# Patient Record
Sex: FEMALE | Race: WHITE | NOT HISPANIC OR LATINO | Employment: FULL TIME | URBAN - METROPOLITAN AREA
[De-identification: names, ages, dates, MRNs, and addresses within clinical notes are randomized per-mention and may not be internally consistent; named-entity substitution may affect disease eponyms.]

---

## 2023-11-03 ENCOUNTER — HOSPITAL ENCOUNTER (OUTPATIENT)
Dept: CARDIOLOGY | Facility: HOSPITAL | Age: 78
Discharge: HOME | End: 2023-11-03
Payer: COMMERCIAL

## 2023-11-03 DIAGNOSIS — I48.0 PAROXYSMAL ATRIAL FIBRILLATION (MULTI): ICD-10-CM

## 2023-11-03 DIAGNOSIS — Z95.0 PACEMAKER: Primary | ICD-10-CM

## 2023-11-03 DIAGNOSIS — Z95.0 PACEMAKER: ICD-10-CM

## 2023-11-06 PROBLEM — S16.1XXA CERVICAL MYOFASCIAL STRAIN: Status: ACTIVE | Noted: 2018-04-13

## 2023-11-06 PROBLEM — N39.0 UTI (URINARY TRACT INFECTION): Status: ACTIVE | Noted: 2023-08-22

## 2023-11-06 PROBLEM — S06.5XAA SUBDURAL HEMATOMA (MULTI): Status: ACTIVE | Noted: 2023-08-21
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
## 2023-11-06 PROBLEM — I60.9 SAH (SUBARACHNOID HEMORRHAGE) (MULTI): Status: ACTIVE | Noted: 2023-08-21

## 2023-11-06 PROBLEM — S39.012A LUMBAR STRAIN: Status: ACTIVE | Noted: 2018-04-13

## 2023-11-06 PROBLEM — E11.9 T2DM (TYPE 2 DIABETES MELLITUS) (MULTI): Status: ACTIVE | Noted: 2023-08-25

## 2023-11-06 PROBLEM — R55 SYNCOPE AND COLLAPSE: Status: ACTIVE | Noted: 2023-08-22

## 2023-11-06 RX ORDER — METOPROLOL TARTRATE 25 MG/1
25 TABLET, FILM COATED ORAL EVERY 12 HOURS
Status: ON HOLD | COMMUNITY
Start: 2023-08-26 | End: 2023-11-24

## 2023-11-06 RX ORDER — ASPIRIN 325 MG
1-2 TABLET ORAL DAILY
Status: ON HOLD | COMMUNITY
Start: 2019-08-27 | End: 2024-06-09 | Stop reason: ALTCHOICE

## 2023-11-06 RX ORDER — WARFARIN 2.5 MG/1
TABLET ORAL
Status: ON HOLD | COMMUNITY
Start: 2011-04-25

## 2023-11-06 RX ORDER — ACETAMINOPHEN 500 MG
1000 TABLET ORAL
Status: ON HOLD | COMMUNITY
Start: 2023-08-26 | End: 2024-06-09 | Stop reason: ALTCHOICE

## 2023-11-06 RX ORDER — IBUPROFEN 200 MG
CAPSULE ORAL
Status: ON HOLD | COMMUNITY
Start: 2023-08-26

## 2023-11-06 RX ORDER — WARFARIN SODIUM 5 MG/1
TABLET ORAL
Status: ON HOLD | COMMUNITY
Start: 2011-04-25

## 2023-11-06 RX ORDER — METFORMIN HYDROCHLORIDE 1000 MG/1
1 TABLET ORAL
Status: ON HOLD | COMMUNITY
Start: 2023-09-16

## 2023-11-07 ENCOUNTER — ANCILLARY PROCEDURE (OUTPATIENT)
Dept: CARDIOLOGY | Facility: CLINIC | Age: 78
End: 2023-11-07
Payer: COMMERCIAL

## 2023-11-07 ENCOUNTER — OFFICE VISIT (OUTPATIENT)
Dept: CARDIOLOGY | Facility: CLINIC | Age: 78
End: 2023-11-07
Payer: COMMERCIAL

## 2023-11-07 VITALS
SYSTOLIC BLOOD PRESSURE: 142 MMHG | OXYGEN SATURATION: 98 % | BODY MASS INDEX: 28.16 KG/M2 | WEIGHT: 169 LBS | HEIGHT: 65 IN | TEMPERATURE: 98.6 F | DIASTOLIC BLOOD PRESSURE: 80 MMHG | HEART RATE: 69 BPM

## 2023-11-07 DIAGNOSIS — Z95.0 CARDIAC PACEMAKER IN SITU: ICD-10-CM

## 2023-11-07 DIAGNOSIS — Z95.0 PACEMAKER: Primary | ICD-10-CM

## 2023-11-07 DIAGNOSIS — I48.0 PAROXYSMAL ATRIAL FIBRILLATION (MULTI): ICD-10-CM

## 2023-11-07 DIAGNOSIS — I48.20 CHRONIC ATRIAL FIBRILLATION (MULTI): ICD-10-CM

## 2023-11-07 PROCEDURE — 3079F DIAST BP 80-89 MM HG: CPT | Performed by: INTERNAL MEDICINE

## 2023-11-07 PROCEDURE — 99213 OFFICE O/P EST LOW 20 MIN: CPT | Performed by: INTERNAL MEDICINE

## 2023-11-07 PROCEDURE — 1159F MED LIST DOCD IN RCRD: CPT | Performed by: INTERNAL MEDICINE

## 2023-11-07 PROCEDURE — 93279 PRGRMG DEV EVAL PM/LDLS PM: CPT | Performed by: INTERNAL MEDICINE

## 2023-11-07 PROCEDURE — 3077F SYST BP >= 140 MM HG: CPT | Performed by: INTERNAL MEDICINE

## 2023-11-07 PROCEDURE — 1036F TOBACCO NON-USER: CPT | Performed by: INTERNAL MEDICINE

## 2023-11-07 ASSESSMENT — ENCOUNTER SYMPTOMS
LOSS OF SENSATION IN FEET: 0
DEPRESSION: 0
OCCASIONAL FEELINGS OF UNSTEADINESS: 0
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
## 2024-06-08 ENCOUNTER — APPOINTMENT (OUTPATIENT)
Dept: RADIOLOGY | Facility: HOSPITAL | Age: 79
DRG: 024 | End: 2024-06-08
Payer: MEDICARE

## 2024-06-08 ENCOUNTER — HOSPITAL ENCOUNTER (INPATIENT)
Facility: HOSPITAL | Age: 79
LOS: 3 days | Discharge: HOSPICE | End: 2024-06-11
Attending: STUDENT IN AN ORGANIZED HEALTH CARE EDUCATION/TRAINING PROGRAM | Admitting: STUDENT IN AN ORGANIZED HEALTH CARE EDUCATION/TRAINING PROGRAM
Payer: MEDICARE

## 2024-06-08 DIAGNOSIS — Z51.5 END OF LIFE CARE: ICD-10-CM

## 2024-06-08 DIAGNOSIS — I63.512 ACUTE ISCHEMIC LEFT MCA STROKE (MULTI): Primary | ICD-10-CM

## 2024-06-08 LAB
ABO GROUP (TYPE) IN BLOOD: NORMAL
ANION GAP BLDV CALCULATED.4IONS-SCNC: 9 MMOL/L (ref 10–25)
ANTIBODY SCREEN: NORMAL
BASE EXCESS BLDV CALC-SCNC: 0.7 MMOL/L (ref -2–3)
BODY TEMPERATURE: 37 DEGREES CELSIUS
CA-I BLDV-SCNC: 1.22 MMOL/L (ref 1.1–1.33)
CHLORIDE BLDV-SCNC: 107 MMOL/L (ref 98–107)
GLUCOSE BLDV-MCNC: 168 MG/DL (ref 74–99)
HCO3 BLDV-SCNC: 25.4 MMOL/L (ref 22–26)
HCT VFR BLD EST: 42 % (ref 36–46)
HGB BLDV-MCNC: 14.1 G/DL (ref 12–16)
INR PPP: 1.1 (ref 0.9–1.1)
LACTATE BLDV-SCNC: 1.4 MMOL/L (ref 0.4–2)
OXYHGB MFR BLDV: 80.1 % (ref 45–75)
PCO2 BLDV: 40 MM HG (ref 41–51)
PH BLDV: 7.41 PH (ref 7.33–7.43)
PO2 BLDV: 52 MM HG (ref 35–45)
POTASSIUM BLDV-SCNC: 5.2 MMOL/L (ref 3.5–5.3)
PROTHROMBIN TIME: 12 SECONDS (ref 9.8–12.8)
RH FACTOR (ANTIGEN D): NORMAL
SAO2 % BLDV: 82 % (ref 45–75)
SODIUM BLDV-SCNC: 136 MMOL/L (ref 136–145)

## 2024-06-08 PROCEDURE — 36224 PLACE CATH CAROTD ART: CPT | Performed by: RADIOLOGY

## 2024-06-08 PROCEDURE — 82947 ASSAY GLUCOSE BLOOD QUANT: CPT | Mod: 91

## 2024-06-08 PROCEDURE — 85610 PROTHROMBIN TIME: CPT | Performed by: STUDENT IN AN ORGANIZED HEALTH CARE EDUCATION/TRAINING PROGRAM

## 2024-06-08 PROCEDURE — 99285 EMERGENCY DEPT VISIT HI MDM: CPT

## 2024-06-08 PROCEDURE — 76377 3D RENDER W/INTRP POSTPROCES: CPT | Performed by: RADIOLOGY

## 2024-06-08 PROCEDURE — C1757 CATH, THROMBECTOMY/EMBOLECT: HCPCS

## 2024-06-08 PROCEDURE — C1876 STENT, NON-COA/NON-COV W/DEL: HCPCS

## 2024-06-08 PROCEDURE — C1760 CLOSURE DEV, VASC: HCPCS

## 2024-06-08 PROCEDURE — C1887 CATHETER, GUIDING: HCPCS

## 2024-06-08 PROCEDURE — 2720000007 HC OR 272 NO HCPCS

## 2024-06-08 PROCEDURE — 03CG3Z7 EXTIRPATION OF MATTER FROM INTRACRANIAL ARTERY USING STENT RETRIEVER, PERCUTANEOUS APPROACH: ICD-10-PCS | Performed by: STUDENT IN AN ORGANIZED HEALTH CARE EDUCATION/TRAINING PROGRAM

## 2024-06-08 PROCEDURE — C1894 INTRO/SHEATH, NON-LASER: HCPCS

## 2024-06-08 PROCEDURE — 36415 COLL VENOUS BLD VENIPUNCTURE: CPT | Performed by: STUDENT IN AN ORGANIZED HEALTH CARE EDUCATION/TRAINING PROGRAM

## 2024-06-08 PROCEDURE — 70498 CT ANGIOGRAPHY NECK: CPT

## 2024-06-08 PROCEDURE — 82947 ASSAY GLUCOSE BLOOD QUANT: CPT

## 2024-06-08 PROCEDURE — C1725 CATH, TRANSLUMIN NON-LASER: HCPCS

## 2024-06-08 PROCEDURE — 2020000001 HC ICU ROOM DAILY

## 2024-06-08 PROCEDURE — 61645 PERQ ART M-THROMBECT &/NFS: CPT | Performed by: RADIOLOGY

## 2024-06-08 PROCEDURE — 86850 RBC ANTIBODY SCREEN: CPT | Performed by: STUDENT IN AN ORGANIZED HEALTH CARE EDUCATION/TRAINING PROGRAM

## 2024-06-08 PROCEDURE — 82805 BLOOD GASES W/O2 SATURATION: CPT

## 2024-06-08 PROCEDURE — 2780000003 HC OR 278 NO HCPCS

## 2024-06-08 PROCEDURE — 70496 CT ANGIOGRAPHY HEAD: CPT

## 2024-06-08 PROCEDURE — 36228 PLACE CATH INTRACRANIAL ART: CPT | Performed by: RADIOLOGY

## 2024-06-08 PROCEDURE — 75710 ARTERY X-RAYS ARM/LEG: CPT | Mod: RT | Performed by: RADIOLOGY

## 2024-06-08 PROCEDURE — 99223 1ST HOSP IP/OBS HIGH 75: CPT | Performed by: STUDENT IN AN ORGANIZED HEALTH CARE EDUCATION/TRAINING PROGRAM

## 2024-06-08 PROCEDURE — C1769 GUIDE WIRE: HCPCS

## 2024-06-08 PROCEDURE — 70450 CT HEAD/BRAIN W/O DYE: CPT

## 2024-06-08 PROCEDURE — 2550000001 HC RX 255 CONTRASTS: Performed by: STUDENT IN AN ORGANIZED HEALTH CARE EDUCATION/TRAINING PROGRAM

## 2024-06-08 PROCEDURE — 2500000004 HC RX 250 GENERAL PHARMACY W/ HCPCS (ALT 636 FOR OP/ED)

## 2024-06-08 RX ORDER — DEXTROSE 50 % IN WATER (D50W) INTRAVENOUS SYRINGE
12.5
Status: DISCONTINUED | OUTPATIENT
Start: 2024-06-08 | End: 2024-06-10

## 2024-06-08 RX ORDER — AMOXICILLIN 250 MG
2 CAPSULE ORAL NIGHTLY
Status: DISCONTINUED | OUTPATIENT
Start: 2024-06-09 | End: 2024-06-10

## 2024-06-08 RX ORDER — LABETALOL HYDROCHLORIDE 5 MG/ML
10 INJECTION, SOLUTION INTRAVENOUS EVERY 10 MIN PRN
Status: DISCONTINUED | OUTPATIENT
Start: 2024-06-08 | End: 2024-06-10

## 2024-06-08 RX ORDER — HYDRALAZINE HYDROCHLORIDE 25 MG/1
25 TABLET, FILM COATED ORAL EVERY 6 HOURS PRN
Status: DISCONTINUED | OUTPATIENT
Start: 2024-06-10 | End: 2024-06-10

## 2024-06-08 RX ORDER — HYDRALAZINE HYDROCHLORIDE 20 MG/ML
10 INJECTION INTRAMUSCULAR; INTRAVENOUS
Status: DISCONTINUED | OUTPATIENT
Start: 2024-06-08 | End: 2024-06-10

## 2024-06-08 RX ORDER — INSULIN LISPRO 100 [IU]/ML
0-5 INJECTION, SOLUTION INTRAVENOUS; SUBCUTANEOUS EVERY 4 HOURS
Status: DISCONTINUED | OUTPATIENT
Start: 2024-06-09 | End: 2024-06-10

## 2024-06-08 RX ORDER — POLYETHYLENE GLYCOL 3350 17 G/17G
17 POWDER, FOR SOLUTION ORAL DAILY PRN
Status: DISCONTINUED | OUTPATIENT
Start: 2024-06-08 | End: 2024-06-11 | Stop reason: HOSPADM

## 2024-06-08 RX ORDER — DEXTROSE 50 % IN WATER (D50W) INTRAVENOUS SYRINGE
25
Status: DISCONTINUED | OUTPATIENT
Start: 2024-06-08 | End: 2024-06-10

## 2024-06-08 RX ORDER — ATORVASTATIN CALCIUM 40 MG/1
40 TABLET, FILM COATED ORAL NIGHTLY
Status: DISCONTINUED | OUTPATIENT
Start: 2024-06-09 | End: 2024-06-10

## 2024-06-08 RX ORDER — ACETAMINOPHEN 325 MG/1
650 TABLET ORAL EVERY 6 HOURS PRN
Status: DISCONTINUED | OUTPATIENT
Start: 2024-06-08 | End: 2024-06-11 | Stop reason: HOSPADM

## 2024-06-08 RX ADMIN — IOHEXOL 100 ML: 350 INJECTION, SOLUTION INTRAVENOUS at 22:05

## 2024-06-08 RX ADMIN — LABETALOL HYDROCHLORIDE 10 MG: 5 INJECTION, SOLUTION INTRAVENOUS at 23:52

## 2024-06-08 RX ADMIN — IOHEXOL 75 ML: 350 INJECTION, SOLUTION INTRAVENOUS at 21:57

## 2024-06-08 ASSESSMENT — PAIN SCALES - GENERAL

## 2024-06-08 ASSESSMENT — PAIN DESCRIPTION - PROGRESSION: CLINICAL_PROGRESSION: NOT CHANGED

## 2024-06-08 ASSESSMENT — COLUMBIA-SUICIDE SEVERITY RATING SCALE - C-SSRS
1. IN THE PAST MONTH, HAVE YOU WISHED YOU WERE DEAD OR WISHED YOU COULD GO TO SLEEP AND NOT WAKE UP?: NO
2. HAVE YOU ACTUALLY HAD ANY THOUGHTS OF KILLING YOURSELF?: NO
6. HAVE YOU EVER DONE ANYTHING, STARTED TO DO ANYTHING, OR PREPARED TO DO ANYTHING TO END YOUR LIFE?: NO

## 2024-06-08 ASSESSMENT — PAIN DESCRIPTION - ONSET: ONSET: ONGOING

## 2024-06-08 ASSESSMENT — PAIN - FUNCTIONAL ASSESSMENT: PAIN_FUNCTIONAL_ASSESSMENT: 0-10

## 2024-06-08 ASSESSMENT — PAIN DESCRIPTION - FREQUENCY: FREQUENCY: CONSTANT/CONTINUOUS

## 2024-06-08 NOTE — Clinical Note
Mechanical thrombectomy complete. Access via right groin. Closure via 8 fr Angioseal. Hemostasis achieved. 2x2 and tegaderm dressing applied. Dressing clean, dry, and intact. No hematoma. Pt to keep right leg straight for 3 hours post deployment time. VSS. Pt transferred to NSU, report given to NSU RN.     In room-2148  Time out-2150  Needle to skin-2155  Access-2159  Device #1 up-2221  Device #1 down-2228  Revasc time-2228  Groin closu re- 2300- Angioseal  TICI- 2B

## 2024-06-09 ENCOUNTER — APPOINTMENT (OUTPATIENT)
Dept: RADIOLOGY | Facility: HOSPITAL | Age: 79
DRG: 024 | End: 2024-06-09
Payer: MEDICARE

## 2024-06-09 LAB
ALBUMIN SERPL BCP-MCNC: 4.2 G/DL (ref 3.4–5)
ANION GAP SERPL CALC-SCNC: 17 MMOL/L (ref 10–20)
BASOPHILS # BLD AUTO: 0.05 X10*3/UL (ref 0–0.1)
BASOPHILS NFR BLD AUTO: 0.7 %
BNP SERPL-MCNC: 380 PG/ML (ref 0–99)
BUN SERPL-MCNC: 17 MG/DL (ref 6–23)
CALCIUM SERPL-MCNC: 9.2 MG/DL (ref 8.6–10.6)
CHLORIDE SERPL-SCNC: 103 MMOL/L (ref 98–107)
CHOLEST SERPL-MCNC: 205 MG/DL (ref 0–199)
CHOLESTEROL/HDL RATIO: 4.3
CO2 SERPL-SCNC: 21 MMOL/L (ref 21–32)
CREAT SERPL-MCNC: 0.63 MG/DL (ref 0.5–1.05)
EGFRCR SERPLBLD CKD-EPI 2021: >90 ML/MIN/1.73M*2
EOSINOPHIL # BLD AUTO: 0.03 X10*3/UL (ref 0–0.4)
EOSINOPHIL NFR BLD AUTO: 0.4 %
ERYTHROCYTE [DISTWIDTH] IN BLOOD BY AUTOMATED COUNT: 12.9 % (ref 11.5–14.5)
EST. AVERAGE GLUCOSE BLD GHB EST-MCNC: 189 MG/DL
GLUCOSE BLD MANUAL STRIP-MCNC: 122 MG/DL (ref 74–99)
GLUCOSE BLD MANUAL STRIP-MCNC: 154 MG/DL (ref 74–99)
GLUCOSE BLD MANUAL STRIP-MCNC: 176 MG/DL (ref 74–99)
GLUCOSE BLD MANUAL STRIP-MCNC: 184 MG/DL (ref 74–99)
GLUCOSE BLD MANUAL STRIP-MCNC: 195 MG/DL (ref 74–99)
GLUCOSE BLD MANUAL STRIP-MCNC: 230 MG/DL (ref 74–99)
GLUCOSE BLD MANUAL STRIP-MCNC: 264 MG/DL (ref 74–99)
GLUCOSE SERPL-MCNC: 183 MG/DL (ref 74–99)
HBA1C MFR BLD: 8.2 %
HCT VFR BLD AUTO: 40.8 % (ref 36–46)
HDLC SERPL-MCNC: 47.6 MG/DL
HGB BLD-MCNC: 13.7 G/DL (ref 12–16)
IMM GRANULOCYTES # BLD AUTO: 0.03 X10*3/UL (ref 0–0.5)
IMM GRANULOCYTES NFR BLD AUTO: 0.4 % (ref 0–0.9)
LDLC SERPL CALC-MCNC: 139 MG/DL
LYMPHOCYTES # BLD AUTO: 0.46 X10*3/UL (ref 0.8–3)
LYMPHOCYTES NFR BLD AUTO: 6.4 %
MAGNESIUM SERPL-MCNC: 1.96 MG/DL (ref 1.6–2.4)
MCH RBC QN AUTO: 29.5 PG (ref 26–34)
MCHC RBC AUTO-ENTMCNC: 33.6 G/DL (ref 32–36)
MCV RBC AUTO: 88 FL (ref 80–100)
MONOCYTES # BLD AUTO: 0.38 X10*3/UL (ref 0.05–0.8)
MONOCYTES NFR BLD AUTO: 5.3 %
NEUTROPHILS # BLD AUTO: 6.21 X10*3/UL (ref 1.6–5.5)
NEUTROPHILS NFR BLD AUTO: 86.8 %
NON HDL CHOLESTEROL: 157 MG/DL (ref 0–149)
NRBC BLD-RTO: 0 /100 WBCS (ref 0–0)
PHOSPHATE SERPL-MCNC: 3.3 MG/DL (ref 2.5–4.9)
PLATELET # BLD AUTO: 171 X10*3/UL (ref 150–450)
POTASSIUM SERPL-SCNC: 4.5 MMOL/L (ref 3.5–5.3)
RBC # BLD AUTO: 4.64 X10*6/UL (ref 4–5.2)
SODIUM SERPL-SCNC: 136 MMOL/L (ref 136–145)
TRIGL SERPL-MCNC: 93 MG/DL (ref 0–149)
VLDL: 19 MG/DL (ref 0–40)
WBC # BLD AUTO: 7.2 X10*3/UL (ref 4.4–11.3)

## 2024-06-09 PROCEDURE — 80061 LIPID PANEL: CPT

## 2024-06-09 PROCEDURE — 2020000001 HC ICU ROOM DAILY

## 2024-06-09 PROCEDURE — 80069 RENAL FUNCTION PANEL: CPT

## 2024-06-09 PROCEDURE — 83036 HEMOGLOBIN GLYCOSYLATED A1C: CPT

## 2024-06-09 PROCEDURE — 70450 CT HEAD/BRAIN W/O DYE: CPT

## 2024-06-09 PROCEDURE — 36415 COLL VENOUS BLD VENIPUNCTURE: CPT

## 2024-06-09 PROCEDURE — 82947 ASSAY GLUCOSE BLOOD QUANT: CPT | Mod: 91,MUE

## 2024-06-09 PROCEDURE — 82947 ASSAY GLUCOSE BLOOD QUANT: CPT

## 2024-06-09 PROCEDURE — 83880 ASSAY OF NATRIURETIC PEPTIDE: CPT

## 2024-06-09 PROCEDURE — 2500000004 HC RX 250 GENERAL PHARMACY W/ HCPCS (ALT 636 FOR OP/ED)

## 2024-06-09 PROCEDURE — 2500000002 HC RX 250 W HCPCS SELF ADMINISTERED DRUGS (ALT 637 FOR MEDICARE OP, ALT 636 FOR OP/ED)

## 2024-06-09 PROCEDURE — 2500000005 HC RX 250 GENERAL PHARMACY W/O HCPCS

## 2024-06-09 PROCEDURE — 83735 ASSAY OF MAGNESIUM: CPT

## 2024-06-09 PROCEDURE — 85025 COMPLETE CBC W/AUTO DIFF WBC: CPT

## 2024-06-09 PROCEDURE — 99291 CRITICAL CARE FIRST HOUR: CPT

## 2024-06-09 RX ORDER — METOPROLOL TARTRATE 1 MG/ML
5 INJECTION, SOLUTION INTRAVENOUS ONCE
Status: COMPLETED | OUTPATIENT
Start: 2024-06-09 | End: 2024-06-09

## 2024-06-09 RX ORDER — METOPROLOL TARTRATE 1 MG/ML
INJECTION, SOLUTION INTRAVENOUS
Status: COMPLETED
Start: 2024-06-09 | End: 2024-06-09

## 2024-06-09 RX ORDER — METOPROLOL TARTRATE 1 MG/ML
5 INJECTION, SOLUTION INTRAVENOUS EVERY 6 HOURS
Status: DISCONTINUED | OUTPATIENT
Start: 2024-06-09 | End: 2024-06-10

## 2024-06-09 RX ORDER — SODIUM CHLORIDE 9 MG/ML
75 INJECTION, SOLUTION INTRAVENOUS CONTINUOUS
Status: ACTIVE | OUTPATIENT
Start: 2024-06-09 | End: 2024-06-10

## 2024-06-09 RX ADMIN — INSULIN LISPRO 2 UNITS: 100 INJECTION, SOLUTION INTRAVENOUS; SUBCUTANEOUS at 13:00

## 2024-06-09 RX ADMIN — METOPROLOL TARTRATE 5 MG: 1 INJECTION, SOLUTION INTRAVENOUS at 07:03

## 2024-06-09 RX ADMIN — SODIUM CHLORIDE 75 ML/HR: 9 INJECTION, SOLUTION INTRAVENOUS at 10:50

## 2024-06-09 RX ADMIN — INSULIN LISPRO 1 UNITS: 100 INJECTION, SOLUTION INTRAVENOUS; SUBCUTANEOUS at 23:58

## 2024-06-09 RX ADMIN — INSULIN LISPRO 1 UNITS: 100 INJECTION, SOLUTION INTRAVENOUS; SUBCUTANEOUS at 04:40

## 2024-06-09 RX ADMIN — INSULIN LISPRO 1 UNITS: 100 INJECTION, SOLUTION INTRAVENOUS; SUBCUTANEOUS at 00:00

## 2024-06-09 RX ADMIN — LABETALOL HYDROCHLORIDE 10 MG: 5 INJECTION, SOLUTION INTRAVENOUS at 04:48

## 2024-06-09 RX ADMIN — INSULIN LISPRO 1 UNITS: 100 INJECTION, SOLUTION INTRAVENOUS; SUBCUTANEOUS at 17:00

## 2024-06-09 RX ADMIN — INSULIN LISPRO 3 UNITS: 100 INJECTION, SOLUTION INTRAVENOUS; SUBCUTANEOUS at 09:00

## 2024-06-09 RX ADMIN — METOPROLOL TARTRATE 5 MG: 5 INJECTION, SOLUTION INTRAVENOUS at 10:42

## 2024-06-09 RX ADMIN — HYDRALAZINE HYDROCHLORIDE 10 MG: 20 INJECTION INTRAMUSCULAR; INTRAVENOUS at 05:39

## 2024-06-09 RX ADMIN — METOPROLOL TARTRATE 5 MG: 5 INJECTION, SOLUTION INTRAVENOUS at 19:38

## 2024-06-09 RX ADMIN — METOPROLOL TARTRATE 5 MG: 5 INJECTION, SOLUTION INTRAVENOUS at 15:52

## 2024-06-09 ASSESSMENT — COGNITIVE AND FUNCTIONAL STATUS - GENERAL
EATING MEALS: TOTAL
WALKING IN HOSPITAL ROOM: TOTAL
HELP NEEDED FOR BATHING: TOTAL
PERSONAL GROOMING: TOTAL
TURNING FROM BACK TO SIDE WHILE IN FLAT BAD: TOTAL
DRESSING REGULAR UPPER BODY CLOTHING: TOTAL
CLIMB 3 TO 5 STEPS WITH RAILING: TOTAL
MOVING TO AND FROM BED TO CHAIR: TOTAL
TOILETING: TOTAL
DRESSING REGULAR LOWER BODY CLOTHING: TOTAL
STANDING UP FROM CHAIR USING ARMS: TOTAL
MOVING FROM LYING ON BACK TO SITTING ON SIDE OF FLAT BED WITH BEDRAILS: TOTAL

## 2024-06-09 ASSESSMENT — PAIN - FUNCTIONAL ASSESSMENT
PAIN_FUNCTIONAL_ASSESSMENT: CPOT (CRITICAL CARE PAIN OBSERVATION TOOL)
PAIN_FUNCTIONAL_ASSESSMENT: CPOT (CRITICAL CARE PAIN OBSERVATION TOOL)
PAIN_FUNCTIONAL_ASSESSMENT: 0-10
PAIN_FUNCTIONAL_ASSESSMENT: 0-10

## 2024-06-09 ASSESSMENT — PAIN SCALES - GENERAL
PAINLEVEL_OUTOF10: 0 - NO PAIN

## 2024-06-09 NOTE — PROGRESS NOTES
Speech-Language Pathology           Therapy Communication Note     Patient Name: Carol Barajas  MRN:  [id number]  Today's Date:  06/09/24  Missed Time: 0842     Missed Visit Reason:  SLP swallow and speech/lang eval attempted however per chart review and nursing NIH currently 19 and pt not able to safely participate at this time.  May consider non-oral means nutrition/hydration at this time. Will re-attempt evaluation when pt clinically appropriate.

## 2024-06-09 NOTE — PROGRESS NOTES
Occupational Therapy                 Therapy Communication Note    Patient Name: Carol Barajas  MRN: [id number]  Today's Date: 6/9/2024     Discipline: Occupational Therapy    Missed Visit Reason: Missed Visit Reason:  (Active bedrest order - RN unable to clarify with team if pt is okay to mobilize despite reaching out. Per RN, pt still on hourly neuro checks after TNK and would prefer to hold OT until clarification from team. Will f/u as appropriate/able.)    Missed Time: Attempt      EMORY BALLARD OT

## 2024-06-09 NOTE — CARE PLAN
Problem: Fall/Injury  Goal: Not fall by end of shift  Outcome: Progressing  Goal: Be free from injury by end of the shift  Outcome: Progressing  Goal: Verbalize understanding of personal risk factors for fall in the hospital  Outcome: Progressing  Goal: Verbalize understanding of risk factor reduction measures to prevent injury from fall in the home  Outcome: Progressing  Goal: Use assistive devices by end of the shift  Outcome: Progressing  Goal: Pace activities to prevent fatigue by end of the shift  Outcome: Progressing     Problem: General Stroke  Goal: Establish a mutual long term goal with patient by discharge  Outcome: Progressing  Goal: Demonstrate improvement in neurological exam throughout the shift  Outcome: Progressing  Goal: Maintain BP within ordered limits throughout shift  Outcome: Progressing  Goal: Participate in treatment (ie., meds, therapy) throughout shift  Outcome: Progressing  Goal: No symptoms of aspiration throughout shift  Outcome: Progressing  Goal: No symptoms of hemorrhage throughout shift  Outcome: Progressing  Goal: Tolerate enteral feeding throughout shift  Outcome: Progressing  Goal: Decreased nausea/vomiting throughout shift  Outcome: Progressing  Goal: Controlled blood glucose throughout shift  Outcome: Progressing  Goal: Out of bed three times today  Outcome: Progressing     Problem: ICU Stroke  Goal: Maintain patent airway throughout shift  Outcome: Progressing  Goal: Achieve/maintain targeted sodium level throughout shift  Outcome: Progressing     Problem: Pain  Goal: My pain/discomfort is manageable  Outcome: Progressing     Problem: Safety  Goal: Patient will be injury free during hospitalization  Outcome: Progressing  Goal: I will remain free of falls  Outcome: Progressing     Problem: Daily Care  Goal: Daily care needs are met  Outcome: Progressing     Problem: Psychosocial Needs  Goal: Demonstrates ability to cope with hospitalization/illness  Outcome: Progressing  Goal:  Collaborate with me, my family, and caregiver to identify my specific goals  Outcome: Progressing     Problem: Discharge Barriers  Goal: My discharge needs are met  Outcome: Progressing     Problem: Pain  Goal: Takes deep breaths with improved pain control throughout the shift  Outcome: Progressing  Goal: Turns in bed with improved pain control throughout the shift  Outcome: Progressing  Goal: Walks with improved pain control throughout the shift  Outcome: Progressing  Goal: Performs ADL's with improved pain control throughout shift  Outcome: Progressing  Goal: Participates in PT with improved pain control throughout the shift  Outcome: Progressing  Goal: Free from opioid side effects throughout the shift  Outcome: Progressing  Goal: Free from acute confusion related to pain meds throughout the shift  Outcome: Progressing   The patient's goals for the shift include  improve mental status, stable HR    The clinical goals for the shift include  maintain HR <100

## 2024-06-09 NOTE — CARE PLAN
The patient's goals for the shift include  Stable HR, improve mental status    The clinical goals for the shift include  HR less than 100

## 2024-06-09 NOTE — PRE-PROCEDURE NOTE
Pre-Procedure H&P     Provider Assessment:  Diagnosis/Reason for Procedure: LVO  Procedure: Mechanical Thrombectomy  Medications Reviewed:   yes   Prophylatic Antibiotics Needed:   no    Neuro status: Aphasic, R side weakness  Mouth Opening OK: yes   Neck Flexibility OK: yes   Sedation Plan: moderate sedation   COVID-19 Risk Consent:  Surgeon has reviewed key risks related to the risk of alesia COVID-19 and if they contract COVID-19 what the risks are.

## 2024-06-09 NOTE — SIGNIFICANT EVENT
Carol Barajas is a 79 y.o. female on day 1 of admission presenting with Acute ischemic left MCA stroke (Multi).    Subjective   Patient seen at bedside appearing drowsy. Follows command to  with L hand, but unable to stick out tongue or blink upon request. Limited improvement with thrombectomy last night. CT concerning for potential subarachnoid hemorrhage.       Objective     Physical Exam    Last Recorded Vitals  Blood pressure 140/79, pulse 94, temperature 36 °C (96.8 °F), temperature source Temporal, resp. rate 18, weight 70.5 kg (155 lb 6.8 oz), SpO2 99%.  Intake/Output last 3 Shifts:  I/O last 3 completed shifts:  In: - (0 mL/kg)   Out: 675 (9.6 mL/kg) [Urine:675 (0.3 mL/kg/hr)]  Weight: 70.5 kg     Neurological Exam  Physical Exam     GENERAL APPEARANCE:  No distress, alert and cooperative.   CARDIOVASCULAR: Afib on monitor. Pulses +2 and equal in all extremities.  MENTAL STATE:  Patient remains aphasic. Awake and somewhat responsive, able to follow command to  with L hand.   CRANIAL NERVES:  Cranial nerves were normal.      CN 2- No blink to threat on R (R HH).      CN 3, 4, 6-  Pupils round, 4 mm in diameter, equally reactive to light. No ptosis, R gaze palsy, not crossing midline, mildly improved on rounds vs. prerounding.     CN 5- Mild R facial droop     CN 7- Unable to assess due to AMS      CN 8- Unable to assess due to AMS      CN 9- Unable to assess due to AMS      CN 11- Unable to assess due to AMS      CN 12- Unable to assess due to AMS   MOTOR:    RUE flicker withdrawal  RLE triple flexion  LUE/LLE spontaneously AG  No fasciculations, tremor or other abnormal movements were present.   REFLEXES:  Right/ Left:  Biceps 2/2, brachioradialis 2/2, triceps 2/2, patellar 2/2, ankle 2/2  Babinski: toes downgoing to plantar stimulation. No clonus.  SENSORY: Localizes with nox stim in all extremities except RLE  COORDINATION: Unable to assess due to AMS    GAIT: Deferred for patient's safety     NIHSS  after Intervention:  1a  Level of consciousness: 0=alert; keenly responsive   1b. LOC questions:  2=Performs neither task correctly   1c. LOC commands: 1=Performs 1 task correctly   2.  Best Gaze: 1=Partial gaze palsy   3. Visual: 2=Complete hemianopia   4. Facial Palsy: 2=Partial paralysis (total or near total paralysis of the lower face)   5a. Motor Left Arm: 0=No drift   5b.  Motor Right Arm: 4=No movement   6a. Motor Left Le=Some effort against gravity   6b  Motor Right Leg:  3=No effort against gravity   7. Limb Ataxia: 0=Absent   8.  Sensory: 0=Normal; no sensory loss   9. Best Language:  2=Severe aphasia: fragmentary expression, inference needed, cannot identify materials   10. Dysarthria: 2=Severe dysarthria   11. Extinction and Inattention: 1=Visual, tactile, auditory, spatial, or personal inattention             Total:   22       Relevant Results  Scheduled medications  [Held by provider] atorvastatin, 40 mg, oral, Nightly  insulin lispro, 0-5 Units, subcutaneous, q4h  [Held by provider] sennosides-docusate sodium, 2 tablet, oral, Nightly      Continuous medications     PRN medications  PRN medications: acetaminophen, dextrose, dextrose, glucagon, glucagon, hydrALAZINE **FOLLOWED BY** [START ON 6/10/2024] hydrALAZINE, labetaloL, oxygen, polyethylene glycol    Results for orders placed or performed during the hospital encounter of 24 (from the past 24 hour(s))   Blood Gas Venous Full Panel Unsolicited   Result Value Ref Range    POCT pH, Venous 7.41 7.33 - 7.43 pH    POCT pCO2, Venous 40 (L) 41 - 51 mm Hg    POCT pO2, Venous 52 (H) 35 - 45 mm Hg    POCT SO2, Venous 82 (H) 45 - 75 %    POCT Oxy Hemoglobin, Venous 80.1 (H) 45.0 - 75.0 %    POCT Hematocrit Calculated, Venous 42.0 36.0 - 46.0 %    POCT Sodium, Venous 136 136 - 145 mmol/L    POCT Potassium, Venous 5.2 3.5 - 5.3 mmol/L    POCT Chloride, Venous 107 98 - 107 mmol/L    POCT Ionized Calicum, Venous 1.22 1.10 - 1.33 mmol/L    POCT Glucose,  Venous 168 (H) 74 - 99 mg/dL    POCT Lactate, Venous 1.4 0.4 - 2.0 mmol/L    POCT Base Excess, Venous 0.7 -2.0 - 3.0 mmol/L    POCT HCO3 Calculated, Venous 25.4 22.0 - 26.0 mmol/L    POCT Hemoglobin, Venous 14.1 12.0 - 16.0 g/dL    POCT Anion Gap, Venous 9.0 (L) 10.0 - 25.0 mmol/L    Patient Temperature 37.0 degrees Celsius   Type And Screen   Result Value Ref Range    ABO TYPE A     Rh TYPE NEG     ANTIBODY SCREEN NEG    Protime-INR   Result Value Ref Range    Protime 12.0 9.8 - 12.8 seconds    INR 1.1 0.9 - 1.1   POCT GLUCOSE   Result Value Ref Range    POCT Glucose 176 (H) 74 - 99 mg/dL   Lipid Panel   Result Value Ref Range    Cholesterol 205 (H) 0 - 199 mg/dL    HDL-Cholesterol 47.6 mg/dL    Cholesterol/HDL Ratio 4.3     LDL Calculated 139 mg/dL    VLDL 19 0 - 40 mg/dL    Triglycerides 93 0 - 149 mg/dL    Non HDL Cholesterol 157 (H) 0 - 149 mg/dL   Hemoglobin A1C   Result Value Ref Range    Hemoglobin A1C 8.2 (H) see below %    Estimated Average Glucose 189 Not Established mg/dL   B-Type Natriuretic Peptide   Result Value Ref Range     (H) 0 - 99 pg/mL   Renal Function Panel   Result Value Ref Range    Glucose 183 (H) 74 - 99 mg/dL    Sodium 136 136 - 145 mmol/L    Potassium 4.5 3.5 - 5.3 mmol/L    Chloride 103 98 - 107 mmol/L    Bicarbonate 21 21 - 32 mmol/L    Anion Gap 17 10 - 20 mmol/L    Urea Nitrogen 17 6 - 23 mg/dL    Creatinine 0.63 0.50 - 1.05 mg/dL    eGFR >90 >60 mL/min/1.73m*2    Calcium 9.2 8.6 - 10.6 mg/dL    Phosphorus 3.3 2.5 - 4.9 mg/dL    Albumin 4.2 3.4 - 5.0 g/dL   CBC and Auto Differential   Result Value Ref Range    WBC 7.2 4.4 - 11.3 x10*3/uL    nRBC 0.0 0.0 - 0.0 /100 WBCs    RBC 4.64 4.00 - 5.20 x10*6/uL    Hemoglobin 13.7 12.0 - 16.0 g/dL    Hematocrit 40.8 36.0 - 46.0 %    MCV 88 80 - 100 fL    MCH 29.5 26.0 - 34.0 pg    MCHC 33.6 32.0 - 36.0 g/dL    RDW 12.9 11.5 - 14.5 %    Platelets 171 150 - 450 x10*3/uL    Neutrophils % 86.8 40.0 - 80.0 %    Immature Granulocytes %,  Automated 0.4 0.0 - 0.9 %    Lymphocytes % 6.4 13.0 - 44.0 %    Monocytes % 5.3 2.0 - 10.0 %    Eosinophils % 0.4 0.0 - 6.0 %    Basophils % 0.7 0.0 - 2.0 %    Neutrophils Absolute 6.21 (H) 1.60 - 5.50 x10*3/uL    Immature Granulocytes Absolute, Automated 0.03 0.00 - 0.50 x10*3/uL    Lymphocytes Absolute 0.46 (L) 0.80 - 3.00 x10*3/uL    Monocytes Absolute 0.38 0.05 - 0.80 x10*3/uL    Eosinophils Absolute 0.03 0.00 - 0.40 x10*3/uL    Basophils Absolute 0.05 0.00 - 0.10 x10*3/uL   Magnesium   Result Value Ref Range    Magnesium 1.96 1.60 - 2.40 mg/dL   POCT GLUCOSE   Result Value Ref Range    POCT Glucose 195 (H) 74 - 99 mg/dL   POCT GLUCOSE   Result Value Ref Range    POCT Glucose 264 (H) 74 - 99 mg/dL       Assessment/Plan   Principal Problem:    Acute ischemic left MCA stroke (Multi)    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 Transferred to [facility] where NIHSS 19 (2-LOC question, 1-LOC command, 2-gaze, 2-visual, 2- facial, 3-RUE, 3-RLE, 2-dysarthria, 2-aphasia), rCTH with ASPECT score of 9 (L insula), CTA with L M1 occlusion. Revascularization achieved at 22:28 with TICI 2b. Post thrombectomy NIHSS 20 (gaze improved to 1 but LLE changed from 0 to 2 due to not able to follow command). Expert CTH with small L BG bleed vs contrast extravasation.     RECOMMENDATIONS:  NEURO:  #LM 1 occlusion s/p TNK and MT TICI 2b  - small L BG bleed vs contrast extravasation    Etiology: Cardioembolic in setting of Afib  - NSU w/ telemonitoring   - RUSTH w/o contrast in 12 hours given concern for SAH  - MRI brain when able (Patient has pacemaker)  - hold ASA if SAH is confirmed.   - Atorvastatin 40 mg  - SBP goal <160  - Hold home  warfarin given TNK     Misc  - Consider dubhoff for feeds  - Troponin, Utox, TTE pending  ...Recent labs: , HbA1c 8.2,    - Monitor RFP, replete electrolytes as needed  - ISS and hypoglycemia protocol  - Hold DVT prophylaxis 24 hrs post TNK if SAH is confirmed.  - PT/OT/SLP pending, notes indicate unable to safely assess currently     Code status: DNR  NOK: Daughter Katya Baker ()       JULIOCESAR HENRIQUEZ  Medical Student, MS3    I agree with the following recommendations above.   Pineda Russell DO  PGY-4 neurology

## 2024-06-09 NOTE — PROGRESS NOTES
Physical Therapy                 Therapy Communication Note    Patient Name: Carol Barajas  MRN: [id number]  Today's Date: 6/9/2024     Discipline: Physical Therapy    Missed Visit Reason: Missed Visit Reason:  (Pt has an active bedrest order and RN has has been unable to clarify with team if pt is okay to mobilize (despite reaching out). Per RN judgment- pt is still on hourly neuro checks after TNK and would prefer to hold PT until clarification from team.)    Missed Time: Attempt    Comment:

## 2024-06-09 NOTE — PROCEDURES
Pre-Procedure Verification and Time Out:  Procedure Location: procedure area  HUDDLE - Pre-procedure Verification:  completed  TIME OUT - Final Verification:  completed immediately prior to procedure start  DEBRIEF: completed    Complications:  None; patient tolerated the procedure well.     Disposition: NSU  Condition: stable  Specimens Collected: No specimens collected    General Information:   Anesthesia/ sedation: Non-Anesthesia  Indication(s)/Pre - Procedure Diagnoses: L MCA occlusion  Post-Procedure Diagnosis: L MCA occlusion s/p mechanical thrombectomy  Procedure Name: Mechanical Thrombectomy   Procedure performed by: Aylin  Assistant(s): Tomy  Estimated Blood Loss (mL): 60  Specimen: no  Informed Consent: consent obtained and in chart    Patient was prepped and draped in sterile fashion  Sterile prep: Chlorhexidine   Positioning: Supine  Medications given during procedure: 8ml topical lidocaine    Last Known Normal: 1900  Access: 8 Fr Sheath R CFA   Closure: Angioseal  Vessels injected: L ICA  Groin puncture time: 2158  Findings: L MCA occlusion (M1 segment)  Initial Thrombectomy Time: 2221  Additional Thrombectomy Times (Times for all device passes): N/A  Revascularization Time: 2228  Mechanical Thrombectomy Device: Penumbra Red 68 reperfusion catheter, Trevo 4mm x 28mm stent retriever   TICI Pre: 0  TICI Post: 2b  Post-procedure BP parameters: SBP <160  Full report to follow in PACS.

## 2024-06-09 NOTE — PROGRESS NOTES
Pharmacy Medication History Review    Carol Barajas is a 79 y.o. female admitted for Acute ischemic left MCA stroke (Multi). Pharmacy reviewed the patient's [id number] medications and allergies for accuracy.    The list below reflects the updated PTA list. Comments regarding how patient may be taking medications differently can be found in the Admit Orders Activity.  Prior to Admission Medications   Prescriptions Last Dose Informant   blood sugar diagnostic (Blood Glucose Test) strip Unknown  Pt not taking per family Family Member, Other   Sig: SUPPLY A BRAND TO MATCH PATIENT'S METER; USE AS DIRECTED TO TEST BLOOD SUGAR   cholecalciferol, vitamin D3, (VITAMIN D3 ORAL) Unknown  Pt not taking per family Family Member, Other   Sig: Take 1 tablet by mouth once daily. Vitamin D   metFORMIN (Glucophage) 1,000 mg tablet Unknown  Pt not taking per family Family Member, Other   Sig: Take 1 tablet (1,000 mg) by mouth 2 times daily (morning and late afternoon).   metoprolol tartrate (Lopressor) 25 mg tablet Unknown  Pt not taking per family Family Member, Other   Sig: Take 1 tablet (25 mg) by mouth every 12 hours.   warfarin (Coumadin) 2.5 mg tablet Unknown  Unable to confirm if pt taking Family Member, Other   Sig: Take by mouth.  ORAL, M,Tu,W,F,Sa   warfarin (Coumadin) 5 mg tablet Unknown  Unable to confirm if pt taking Family Member, Other   Sig: ORAL, Teixeira,Th      Facility-Administered Medications: None        The list below reflects the updated allergy list. Please review each documented allergy for additional clarification and justification.  Allergies  Reviewed by Debo Joy PharmD on 6/9/2024        Severity Reactions Comments    Penicillins High Anaphylaxis, Swelling     Codeine Not Specified GI Upset, Nausea/vomiting, Other nausea            Sources used to complete the med history include:  Out patient fill history - unreliable, pt does not have active coverage on file, unable to determine accurate last  fills  Fill hx limited - last fills 2023; however, insurance coverage has  so may not be accurate  Outpatient pharmacy - [facility] [place], [place] (P: [phone])   Called to confirm fill hx, pt has not filled medications since 2023, did not transfer any medications out  Has 5 medications on profile including Xarelto which was never filled, no Warfarin on file  OARRS: checked 24  Concerns: No - no fill hx  Chart Review  23 Office visit w/ Cardiology ([facility])  2023 Hospital admission/follow ups  Patient interview: unable to interview pt due to altered mental status, daughter was available for hx. Reports only known medication pt takes is warfarin; however, unable to confirm pt taking warfarin w/ EMR, fill hx, or pt notes.     Medication Reconciliation  Added: none  Changed: none  Removed:  Acetaminophen - pt not taking, therapy completed  Aspirin - pt not taking, therapy completed    Below are additional concerns with the patient's PTA list.  PTA med list review complicated by limited EMR, pt unable to participate in interview, limited fill hx.  Adherence/Accessibility: pt expected to be non-adherent to current regimen  Family reports pt only taking warfarin PTA; however unable to confirm and NOT expected to be taking   No fill hx; however, may be paying w/o insurance or obtaining at pharmacy that does not report to EMR  INR drawn at admission = 1.0, pt not expected to be taking warfarin at home d/t subtherapeutic INR  Fill hx: incomplete/unreliable  Other prescription meds on PTA list: metformin, metoprolol - last fill hx 2023 for short term supply      Patient was unable to be assessed for M2B at discharge. Pharmacy has been updated to [facility].  **Patient will most likely be initiated on multiple new medications at discharge, recommend M2B to facilitate transition of care**  Pt does not have active insurance coverage on file. Insurance coverage from last  scanned card (2023) has .     GERRY KIRK, PharmD  PGY-1 Resident Pharmacist  Please reach out via Secure Chat for questions, or if no response call [employer] or [employer]

## 2024-06-09 NOTE — HOSPITAL COURSE
79 y.o. RH female w/ PMHx of CAD, Afib (on Warfarin, subtherapeutic INR), VVIR pacemaker (placed on 8/25/2023, DM2 (diagnosed 8/2023 with A1c 7.6), C2-C6 cervical disc disease (significant b/l foraminal canal stenosis), [place] [facility] [place] syndrome, LKW 19:00 pm, s/p TNK @ 20:17 s/p thrombectomy 22:30 6/8. Admitted to [facility] for post-MT care. rCTH performed, negative for SAH. Downgraded to LING 06/10/24 with neuro checks every 2 hours. TTE 6/10 shows Afib, normal LV function, and no evidence of LV thrombus. Limited improvement in physical exam and NIHSS.  We discussed patient's current medical status, reviewed imaging, and went over the patient's prognosis.  After significant discussion, the family relayed the patient's previously expressed wishes to not continue with life support measures. Therefore, patient's status changed to Comfort Care.  Patient was discharged to hospice.

## 2024-06-09 NOTE — PROGRESS NOTES
[facility]  NEUROSCIENCE INTENSIVE CARE UNIT  DAILY PROGRESS NOTE       Patient Name: Carol Barajas   MRN: [id number]     Admit Date: 2024     : 1945 AGE: 79 y.o. GENDER: female        Subjective    79 y.o. RH female w/ PMHx of CAD, Afib (on Warfarin), VVIR pacemaker (placed on 2023, DM2 (diagnosed 2023 with A1c 7.6), C2-C6 cervical disc disease (significant b/l foraminal canal stenosis), presenting as a transfer from [facility] with L MCA stroke syndrome, LKW 19:00 pm, s/p TNK @ 20:17 s/p thrombectomy 22:30 .     Interval Events: Presents to NSU post-thrombectomy. Heart rates elevated to 100-110 at time of morning signout. Appears to be A.fib on tele. Given metoprolol 5 mg. No other acute events.     Objective   VITALS (24H):  Heart Rate:  []   Temp:  [36 °C (96.8 °F)-36.9 °C (98.4 °F)]   Resp:  [13-24]   BP: (111-174)/()   Weight:  [70.5 kg (155 lb 6.8 oz)]   SpO2:  [94 %-100 %]    INTAKE/OUTPUT:    Intake/Output Summary (Last 24 hours) at 2024 0844  Last data filed at 2024 0600  Gross per 24 hour   Intake --   Output 675 ml   Net -675 ml      VENT SETTINGS:  FiO2 (%):  [25 %-40 %] 36 %    PHYSICAL EXAM:  NEURO:  - Eyes open to voice, left gaze deviation    - Follows commands with left upper and lower extremity, right lower extremity flaccid   - Mute, aphasic  - NIHSS 21  CV:  - Tachycardic, irregular rhythm   RESP:  - Regular, unlabored  :  - Staton catheter in place  GI:  - abdomen soft, non-distended   SKIN:  - Intact    MEDICATIONS:  Scheduled: PRN: Continuous:   [Held by provider] atorvastatin, 40 mg, oral, Nightly  insulin lispro, 0-5 Units, subcutaneous, q4h  [Held by provider] sennosides-docusate sodium, 2 tablet, oral, Nightly     PRN medications: acetaminophen, dextrose, dextrose, glucagon, glucagon, hydrALAZINE **FOLLOWED BY** [START ON 6/10/2024] hydrALAZINE, labetaloL, oxygen, polyethylene glycol       IMAGING RESULTS:  CT brain attack head wo IV  contrast   Final Result   Occlusive thrombus of the proximal left M1 portion of the MCA. No   significant stenosis of the remainder of the large vessels.        No evidence of large territorial infarct on the noncontrast portion   of the CT scan.        I personally reviewed the images/study and I agree with the findings   as stated by resident physician Dr. Deondre Sky . This study   was interpreted at [place], [place].        MACRO:   Deondre Sky discussed the significance and urgency of this   critical finding by telephone with  NICHOLE SHAFER on 6/8/2024 at   10:11 pm.  (**-RCF-**) Findings:  See findings.        Signed by: Daniel Morgan 6/8/2024 10:28 PM   Dictation workstation:   [id number]      CT brain attack angio head and neck W and WO IV contrast   Final Result   Occlusive thrombus of the proximal left M1 portion of the MCA. No   significant stenosis of the remainder of the large vessels.        No evidence of large territorial infarct on the noncontrast portion   of the CT scan.        I personally reviewed the images/study and I agree with the findings   as stated by resident physician Dr. Deondre Sky . This study   was interpreted at [place], [place].        MACRO:   Deondre Sky discussed the significance and urgency of this   critical finding by telephone with  NICHOLE SHAFER on 6/8/2024 at   10:11 pm.  (**-RCF-**) Findings:  See findings.        Signed by: Daniel Morgan 6/8/2024 10:28 PM   Dictation workstation:   [id number]      IR intervention NEURO thrombectomy    (Results Pending)   Consult to Interventional Radiology    (Results Pending)   CT head wo IV contrast    (Results Pending)         Assessment/Plan    NEURO:  #L MCA stroke   Assessment:  - 6/8: TNK, thrombectomy TICI 2b, post revascularization NIHSS 20   - CTH with small L BG bleed vs contrast  extravasation   - NIHSS remains severely elevated 21  - repeat CTH 6 hrs post-thrombectomy stable  Plan:  - NSU  - Neuro Checks: Q1H  - PT/OT/SLP  - repeat CT head 2100 for stability   - MRI   - aspirin 81 mg 24-hr post TNK   - BP goal < 160    CARDIOVASCULAR:  #CAD  # Afib (VVIR and warfarin)  Assessment:  -  bnp 380     Plan:  - Continue to monitor on telemetry  - BP goal: SBP < 160    --> PRN: Labetalol and Hydralazine   - metoprolol 5 mg IV q 6hrs until enteral access   - hold home metoprolol PO until enteral access   - hold home warfarin     RESPIRATORY:  - no issues  Plan:  - Continuous pulse oximetry   - O2 PRN to maintain SpO2 > 94%, wean as tolerated    RENAL/:  - no issues   Assessment:  Results from last 72 hours   Lab Units 24  0010   BUN mg/dL 17   CREATININE mg/dL 0.63       Plan:  - Monitor with daily RFP  - Maintain may catheter for: critically ill patient who need accurate urinary output measurements    FEN/GI:  - no issues   Plan:  - Monitor and replace electrolytes per protocol  - IVF: NS @ 75 mL/hr  - Diet: NPO   - feeding tube placement 24 hrs after TNK (2100)    ENDOCRINE:  #DM2  Assessment:  - Hgb A1c 8.2  Results from last 7 days   Lab Units 24  0815 24  0440 24  0010 24  2358   POCT GLUCOSE mg/dL 264* 195*  --  176*   GLUCOSE mg/dL  --   --  183*  --       Plan:  - Accuchecks & ISS     HEMATOLOGY:  - no issues   Assessment:  Results from last 7 days   Lab Units 24  0011   HEMOGLOBIN g/dL 13.7   HEMATOCRIT % 40.8   PLATELETS AUTO x10*3/uL 171     Plan:  - Continue to monitor with daily CBC and Coag panel    INFECTIOUS DISEASE:  - no issues   Assessment:  Results from last 7 days   Lab Units 24  0011   WBC AUTO x10*3/uL 7.2    - Temp (24hrs), Av.4 °C (97.6 °F), Min:36 °C (96.8 °F), Max:36.9 °C (98.4 °F)    Plan:  - Continue to monitor for s/sx of infection  - Pan culture for temperature > 38.4 C    MUSCULOSKELETAL:  #C2-C6 cervical disc  disease with significant b/l foraminal canal stenosis    SKIN:  - No acute issues  - Turns and skin care per NSU protocol    ACCESS:  - Staton catheter    PROPHYLAXIS:  - DVT Ppx: SCDs  - GI Ppx: none    RESTRAINTS:  Not indicated/Patient does not meet criteria for restraints    Sammy Coughlin, DO  Emergency Medicine PGY-2  Neuroscience Intensive Care

## 2024-06-09 NOTE — H&P
History Of Present Illness  Carol Barajas is a 79 y.o. RH female w/ PMHx of CAD, Afib (on Warfarin), VVIR pacemaker (placed on 8/25/2023, DM2 (diagnosed 8/2023 with A1c 7.6), C2-C6 cervical disc disease (significant b/l foraminal canal stenosis), presenting as a transfer from [facility] [place] with L MCA stroke syndrome, LKW 19:00 pm, s/p TNK @ 20:17.    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 She was taken to ED where work up revealed L M1 occlusion. Vitals: 161/104, HR  (irregular), Glucose 134, INR 1, NIHSS 25 (break down not available). TNK was administered at 20:17 pm and she was transferred to [facility] to be evaluated for thrombectomy.     BAT paged around 21:06 pm and patient arrived to ED around 21:10 pm. Images from [facility] [place] still unavailable for review. /112, NIHSS 19 (2-LOC question, 1-LOC command, 2-gaze, 2-visual, 2- facial, 3-RUE, 3-RLE, 2-dysarthria, 2-aphasia), repeat CTH with ASPECT score of 9 (L insula), CTA with L M1 occlusion. Still unable to reach family by phone so 2 physician consent was obtained for thrombectomy (Dr. Reji Banda and Dr. TEOFILO Huitron). Revascularization achieved at 22:28 with TICI 2b. Post thrombectomy NIHSS 20 (gaze improved to 1 but LLE changed from 0 to 2 due to not able to follow command). Expert CTH with small L BG bleed vs contrast extravasation.    Patient admitted to [facility] where repeat NIHSS remained 20. Patients daughter arrived to [facility] and reported that patient is completely independent at baseline and the only medication she takes is warfarin.     Unable to obtain ROS due to patient's AMS.    Past Medical History  History reviewed. No pertinent past medical history.  Surgical History  History reviewed. No pertinent surgical history.  Social History  Social History     Tobacco Use    Smoking status: Never    Smokeless tobacco: Never    Substance Use Topics    Alcohol use: Not Currently    Drug use: Never     Allergies  Penicillins and Codeine  Home Medications  Medications Prior to Admission   Medication Sig Dispense Refill Last Dose    acetaminophen (Tylenol) 500 mg tablet Take 2 tablets (1,000 mg) by mouth every 8 (eight) weeks.       aspirin 325 mg tablet Take 1-2 tablets (325-650 mg) by mouth once daily.       blood sugar diagnostic (Blood Glucose Test) strip SUPPLY A BRAND TO MATCH PATIENT'S METER; USE AS DIRECTED TO TEST BLOOD SUGAR       cholecalciferol, vitamin D3, (VITAMIN D3 ORAL) Take 1 tablet by mouth once daily. Vitamin D       metFORMIN (Glucophage) 1,000 mg tablet Take 1 tablet (1,000 mg) by mouth 2 times a day with meals.       metoprolol tartrate (Lopressor) 25 mg tablet Take 1 tablet (25 mg) by mouth every 12 hours.       warfarin (Coumadin) 2.5 mg tablet Take by mouth.  ORAL, M,Tu,W,F,Sa       warfarin (Coumadin) 5 mg tablet ORAL, Teixeira,Th          Neurological Exam  Physical Exam    GENERAL APPEARANCE:  No distress, alert and cooperative.   CARDIOVASCULAR: Afib on monitor. Pulses +2 and equal in all extremities.   MENTAL STATE:  Patient currently aphasic, only mumbles and not able to follow commands. She is however awake and alert.  OPHTHALMOSCOPIC: Differed for COVID  CRANIAL NERVES:  Cranial nerves were normal.      CN 2- No blink to threat on R (R HH).      CN 3, 4, 6-  Pupils round, 4 mm in diameter, equally reactive to light. No ptosis. R gaze palsy, barely crossing midline.     CN 5- Mild R facial droop      CN 7- Unable to assess due to AMS      CN 8- Unable to assess due to AMS      CN 9- Unable to assess due to AMS      CN 11- Unable to assess due to AMS      CN 12- Unable to assess due to AMS   MOTOR:    RUE flicker withdraw  RLE triple flexion  LUE/LLE spontaneously AG  No fasciculations, tremor or other abnormal movements were present.   REFLEXES:  Right/ Left:  Biceps 2/2, brachioradialis 2/2, triceps 2/2, patellar  2/2, ankle 2/2  Babinski: toes downgoing to plantar stimulation. No clonus.  SENSORY: Localizes with nox stim in all extremities except RLE  COORDINATION: Unable to assess due to AMS    GAIT: Differed for patient's safety     NIHSS on Arrival:  1a  Level of consciousness: 0=alert; keenly responsive   1b. LOC questions:  2=Performs neither task correctly   1c. LOC commands: 1=Performs one task correctly   2.  Best Gaze: 2=forced deviation, or total gaze paresis not overcome by oculocephalic maneuver   3. Visual: 2=Complete hemianopia   4. Facial Palsy: 2=Partial paralysis (total or near total paralysis of the lower face)   5a. Motor Left Arm: 0=No drift, limb holds 90 (or 45) degrees for full 10 seconds   5b.  Motor Right Arm: 3=No effort against gravity, limb falls   6a. Motor Left Le=No drift, limb holds 90 (or 45) degrees for full 10 seconds   6b  Motor Right Leg:  3=No effort against gravity, limb falls   7. Limb Ataxia: 0=Absent   8.  Sensory: 0=Normal; no sensory loss   9. Best Language:  2=Severe Aphasia: fragmentary expression, inference needed, cannot identify materials   10. Dysarthria: 2=Severe; patient speech is so slurred as to be unintelligible in the absence of or our of proportion to any dysphagia, or is mute/anarthric   11. Extinction and Inattention: 0=No abnormality          Total:   19         NIHSS after Intervention:  1a  Level of consciousness: 0=alert; keenly responsive   1b. LOC questions:  2=Performs neither task correctly   1c. LOC commands: 2=Performs neither task correctly   2.  Best Gaze: 1=partial gaze palsy   3. Visual: 2=Complete hemianopia   4. Facial Palsy: 2=Partial paralysis (total or near total paralysis of the lower face)   5a. Motor Left Arm: 0=No drift, limb holds 90 (or 45) degrees for full 10 seconds   5b.  Motor Right Arm: 3=No effort against gravity, limb falls   6a. Motor Left Le=Some effort against gravity, limb cannot get to or maintain (if cured) 90 (or 45)  "degrees, drifts down to bed, but has some effort against gravity   6b  Motor Right Leg:  3=No effort against gravity, limb falls   7. Limb Ataxia: 0=Absent   8.  Sensory: 0=Normal; no sensory loss   9. Best Language:  2=Severe Aphasia: fragmentary expression, inference needed, cannot identify materials   10. Dysarthria: 2=Severe; patient speech is so slurred as to be unintelligible in the absence of or our of proportion to any dysphagia, or is mute/anarthric   11. Extinction and Inattention: 0=No abnormality          Total:   20        Last Recorded Vitals  Blood pressure (!) 155/103, pulse 100, temperature 36 °C (96.8 °F), temperature source Temporal, resp. rate 15, SpO2 99%.          Relevant Results      NIH Stroke Scale  1A. Level of Consciousness: Arouses to Minor Stimulation  1B. Ask Month and Age: No Questions Right  1C. Blink Eyes & Squeeze Hands: Performs Both Tasks  2. Best Gaze: Partial Gaze Palsy  3. Visual: No Visual Loss  4. Facial Palsy: Partial Paralysis  5A. Motor - Left Arm: No Drift  5B. Motor - Right Arm: No Movement  6A. Motor - Left Leg: Some Effort Against Gravity  6B. Motor - Right Leg: No Movement  7. Limb Ataxia: Present in Two Limbs  8. Sensory Loss: Mild-to-Moderate Sensory Loss  9. Best Language: Mute, Global Aphasia  10. Dysarthria: Severe Dysarthria  11. Extinction and Inattention: Visual, Tactile, Auditory, Spatial, or Personal Inattention  NIH Stroke Scale: 25  No MRI head results found for the past 14 days  No CT head results found for the past 14 days  No echocardiogram results found for the past 14 days        No results found for: \"BNP\"  I have personally reviewed the following imaging results CT brain attack head wo IV contrast    Result Date: 6/8/2024  Interpreted By:  Daniel Morgan,  and Dereck Helmshrab STUDY: CT BRAIN ATTACK HEAD WO IV CONTRAST; CT BRAIN ATTACK ANGIO HEAD AND NECK W AND WO IV CONTRAST;  6/8/2024 9:57 pm   INDICATION: Signs/Symptoms:stroke.   COMPARISON: " None.   ACCESSION NUMBER(S): [id number]; [id number]   ORDERING CLINICIAN: NICHOLE SHAFER   TECHNIQUE: Unenhanced CT images of the head were obtained. Subsequently, 75 ML of Omnipaque 350 was administered intravenously and axial images of the head and neck were acquired.  Coronal, sagittal, and 3-D reconstructions were provided for review.   FINDINGS:   CT HEAD:   CSF Spaces: No hydrocephalus. Mild generalized cerebral volume loss and associated ventricular and sulcal enlargement.. Evaluation of sulci/extra-axial spaces somewhat limited due to recent intravenous contrast administration. No evidence of extra-axial collections.   Parenchyma: There are periventricular and subcortical white matter hypodensities which are nonspecific but likely represent sequela of mild to moderate small-vessel ischemic changes. The grey-white differentiation is intact. There is no mass effect or midline shift. There is no intracranial hemorrhage.   Calvarium: The calvarium is unremarkable.   Paranasal sinuses and mastoids: Visualized paranasal sinuses and mastoids are clear.   CTA HEAD FINDINGS:   Anterior circulation: Bilateral intracranial internal carotid arteries are widely patent with scattered atherosclerotic calcifications of the carotid siphons without stenosis. Bilateral anterior cerebral arteries are patent. There is abrupt cut off the proximal left M1. The right MCA is patent.   Posterior circulation: Bilateral intracranial vertebral arteries, vertebrobasilar junction, basilar artery and proximal posterior cerebral arteries are patent without focal stenosis.   CTA NECK FINDINGS:   Right carotid vessels: The common carotid artery is normal. The carotid bifurcation is normal. The internal carotid artery in the neck is normal. No significant stenosis.   Left carotid vessels: The common carotid artery is normal. The carotid bifurcation is normal. The internal carotid artery in the neck is normal. No significant stenosis.    Vertebral vessels:  The visualized segments of the cervical vertebral arteries are normal in caliber.       Occlusive thrombus of the proximal left M1 portion of the MCA. No significant stenosis of the remainder of the large vessels.   No evidence of large territorial infarct on the noncontrast portion of the CT scan.   I personally reviewed the images/study and I agree with the findings as stated by resident physician Dr. Deondre Sky . This study was interpreted at [facility], [place], [place].   MACRO: Deondre Sky discussed the significance and urgency of this critical finding by telephone with  NICHOLE SHAFER on 6/8/2024 at 10:11 pm.  (**-RCF-**) Findings:  See findings.   Signed by: Daniel Morgan 6/8/2024 10:28 PM Dictation workstation:   [id number]    CT brain attack angio head and neck W and WO IV contrast    Result Date: 6/8/2024  Interpreted By:  Daniel Morgan,  and Dereck Sky Deondre STUDY: CT BRAIN ATTACK HEAD WO IV CONTRAST; CT BRAIN ATTACK ANGIO HEAD AND NECK W AND WO IV CONTRAST;  6/8/2024 9:57 pm   INDICATION: Signs/Symptoms:stroke.   COMPARISON: None.   ACCESSION NUMBER(S): [id number]; [id number]   ORDERING CLINICIAN: NICHOLE SHAFER   TECHNIQUE: Unenhanced CT images of the head were obtained. Subsequently, 75 ML of Omnipaque 350 was administered intravenously and axial images of the head and neck were acquired.  Coronal, sagittal, and 3-D reconstructions were provided for review.   FINDINGS:   CT HEAD:   CSF Spaces: No hydrocephalus. Mild generalized cerebral volume loss and associated ventricular and sulcal enlargement.. Evaluation of sulci/extra-axial spaces somewhat limited due to recent intravenous contrast administration. No evidence of extra-axial collections.   Parenchyma: There are periventricular and subcortical white matter hypodensities which are nonspecific but likely represent sequela of mild to moderate small-vessel ischemic  changes. The grey-white differentiation is intact. There is no mass effect or midline shift. There is no intracranial hemorrhage.   Calvarium: The calvarium is unremarkable.   Paranasal sinuses and mastoids: Visualized paranasal sinuses and mastoids are clear.   CTA HEAD FINDINGS:   Anterior circulation: Bilateral intracranial internal carotid arteries are widely patent with scattered atherosclerotic calcifications of the carotid siphons without stenosis. Bilateral anterior cerebral arteries are patent. There is abrupt cut off the proximal left M1. The right MCA is patent.   Posterior circulation: Bilateral intracranial vertebral arteries, vertebrobasilar junction, basilar artery and proximal posterior cerebral arteries are patent without focal stenosis.   CTA NECK FINDINGS:   Right carotid vessels: The common carotid artery is normal. The carotid bifurcation is normal. The internal carotid artery in the neck is normal. No significant stenosis.   Left carotid vessels: The common carotid artery is normal. The carotid bifurcation is normal. The internal carotid artery in the neck is normal. No significant stenosis.   Vertebral vessels:  The visualized segments of the cervical vertebral arteries are normal in caliber.       Occlusive thrombus of the proximal left M1 portion of the MCA. No significant stenosis of the remainder of the large vessels.   No evidence of large territorial infarct on the noncontrast portion of the CT scan.   I personally reviewed the images/study and I agree with the findings as stated by resident physician Dr. Deondre Sky . This study was interpreted at [facility], [place], [place].   MACRO: Deondre Sky discussed the significance and urgency of this critical finding by telephone with  NICHOLE HARDENBRENDON on 6/8/2024 at 10:11 pm.  (**-RCF-**) Findings:  See findings.   Signed by: Daniel Morgan 6/8/2024 10:28 PM Dictation workstation:    [id number]  .     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 [place] [facility] [place]SS 19 (2-LOC question, 1-LOC command, 2-gaze, 2-visual, 2- facial, 3-RUE, 3-RLE, 2-dysarthria, 2-aphasia), rCTH with ASPECT score of 9 (L insula), CTA with L M1 occlusion. Revascularization achieved at 22:28 with TICI 2b. Post thrombectomy NIHSS 20 (gaze improved to 1 but LLE changed from 0 to 2 due to not able to follow command). Expert CTH with small L BG bleed vs contrast extravasation.    Stroke Metrics:  EMS pre-notification?: Yes  Time of stroke team arrival: 21:07  Direct to CT?: No  Time of CTH read by stroke team: As it was being performed   Time of TNK: 20:17  Time stroke team called IR: 20:25  Time pt arrived to angio suite: 21:45  Time of groin puncture: 21:58  Time of recanalization: 22:28  Number of passes:   Device used: Penumbra Red 68 reperfusion catheter, Trevo 4mm x 28mm stent retriever   Any delays in the process (if door to TNK >30 min): NA    Stroke Classification:  Type: Ischemic stroke  Subtype/etiology: Large vessel occlusion   Vessels involved: L MCA (M1)  Neurological manifestations: R hemiplegia, aphasia, L gaze  Pre-Intervention Deficits: NIHSS 19 (2-LOC question, 1-LOC command, 2-gaze, 2-visual, 2- facial, 3-RUE, 3-RLE, 2-dysarthria, 2-aphasia)  Post-Intervention Deficits: NIHSS 20 (2-LOC question, 2-LOC command, 1-gaze, 2-visual, 2- facial,  3-RUE, 3-RLE, 2-LLE, 2-dysarthria, 2-aphasia)  Pre-stroke mRS: 0  Initial treatment: TNK  Anti-platelets or Anti-coagulation management: N/A  Vascular Risk Factors: D2M, Afib, CAD  Antiplatelet/antithrombotic plan for stroke prevention: NA  VTE prophylaxis: Differed for 24 hrs given TNK  Vascular Risk Factor modification goals:  Blood pressure goals: avoid hypotension SBP <100 that could worsen cerebral perfusion, Ischemic stroke post-thrombectomy <160   Lipid Goals: education on healthy diet and statin therapy to maintain or achieve goal LDL-cholesterol < 70mg  Glucose Goals: early treatment of hyperglycemia to goal glucose 140-180 mg/dl with long-term goal A1c < 7%   Smoking Cessation and Education  Assessment for Rehabilitation needs   Patient and family education on signs and symptoms of stroke, calling 911, healthy strategies for stroke prevention.      PLAN:  NEURO:  #LM 1 occlusion s/p TNK and MT TICI 2b  - Admit to [facility] w/ telemonitoring   - rCTH 6hrs post thrombectomy (ordered)  - MRI brain when able (Patient has pacemaker)  - ASA 81 mg 24 hr post TNK  - Atorva pending LDL  - Give labetalol/hydralazine PRN for blood pressure goal <160  - Troponin, BNP pending  - UTox pending  - TTE pending  - LDL, HbA1c pending    #Afib  - Hold home warfarin given TNK  - Discuss timing to resume AC     #Diabetes   - Diagnosed 8/2023 and was supposed to be on metformin but not taking  - ISS    Misc  - Keep NPO, pending SLP swallow eval  - Monitor RFP, replete electrolytes as needed  - ISS and hypoglycemia protocol  - DVT prophylaxis 24 hrs post TNK, + SCDs  - PT/OT/SLP pending    Code status: DNR  NOK: Daughter Katya Baker ()      Severine L Kako, MD

## 2024-06-09 NOTE — ED TRIAGE NOTES
Pt came from outside hospital and was given TXA. Pt transported by critical care transport for possible stroke, right sided paralysis and left facial droop

## 2024-06-09 NOTE — SIGNIFICANT EVENT
Due to inability to reach patient's family and  emergent nature of patient's condition (acute ischemic stroke), two physician consent was obtained to proceed with mechanical thrombectomy (Dr. Reji Banda and Dr. TEOFILO Huitron)

## 2024-06-10 ENCOUNTER — APPOINTMENT (OUTPATIENT)
Dept: CARDIOLOGY | Facility: HOSPITAL | Age: 79
DRG: 024 | End: 2024-06-10
Payer: MEDICARE

## 2024-06-10 LAB
ALBUMIN SERPL BCP-MCNC: 4.1 G/DL (ref 3.4–5)
ANION GAP SERPL CALC-SCNC: 15 MMOL/L (ref 10–20)
AORTIC VALVE PEAK VELOCITY: 1.23 M/S
AV PEAK GRADIENT: 6.1 MMHG
AVA (PEAK VEL): 2.49 CM2
BASOPHILS # BLD AUTO: 0.03 X10*3/UL (ref 0–0.1)
BASOPHILS NFR BLD AUTO: 0.2 %
BUN SERPL-MCNC: 23 MG/DL (ref 6–23)
CALCIUM SERPL-MCNC: 9.6 MG/DL (ref 8.6–10.6)
CHLORIDE SERPL-SCNC: 105 MMOL/L (ref 98–107)
CO2 SERPL-SCNC: 24 MMOL/L (ref 21–32)
CREAT SERPL-MCNC: 0.66 MG/DL (ref 0.5–1.05)
EGFRCR SERPLBLD CKD-EPI 2021: 90 ML/MIN/1.73M*2
EOSINOPHIL # BLD AUTO: 0 X10*3/UL (ref 0–0.4)
EOSINOPHIL NFR BLD AUTO: 0 %
ERYTHROCYTE [DISTWIDTH] IN BLOOD BY AUTOMATED COUNT: 13.1 % (ref 11.5–14.5)
GLUCOSE BLD MANUAL STRIP-MCNC: 142 MG/DL (ref 74–99)
GLUCOSE BLD MANUAL STRIP-MCNC: 152 MG/DL (ref 74–99)
GLUCOSE BLD MANUAL STRIP-MCNC: 164 MG/DL (ref 74–99)
GLUCOSE SERPL-MCNC: 160 MG/DL (ref 74–99)
HCT VFR BLD AUTO: 39.4 % (ref 36–46)
HGB BLD-MCNC: 13.3 G/DL (ref 12–16)
IMM GRANULOCYTES # BLD AUTO: 0.08 X10*3/UL (ref 0–0.5)
IMM GRANULOCYTES NFR BLD AUTO: 0.6 % (ref 0–0.9)
LEFT VENTRICULAR OUTFLOW TRACT DIAMETER: 2 CM
LYMPHOCYTES # BLD AUTO: 0.62 X10*3/UL (ref 0.8–3)
LYMPHOCYTES NFR BLD AUTO: 4.6 %
MAGNESIUM SERPL-MCNC: 1.99 MG/DL (ref 1.6–2.4)
MCH RBC QN AUTO: 29.8 PG (ref 26–34)
MCHC RBC AUTO-ENTMCNC: 33.8 G/DL (ref 32–36)
MCV RBC AUTO: 88 FL (ref 80–100)
MONOCYTES # BLD AUTO: 0.97 X10*3/UL (ref 0.05–0.8)
MONOCYTES NFR BLD AUTO: 7.2 %
NEUTROPHILS # BLD AUTO: 11.86 X10*3/UL (ref 1.6–5.5)
NEUTROPHILS NFR BLD AUTO: 87.4 %
NRBC BLD-RTO: 0 /100 WBCS (ref 0–0)
PHOSPHATE SERPL-MCNC: 3.5 MG/DL (ref 2.5–4.9)
PLATELET # BLD AUTO: 186 X10*3/UL (ref 150–450)
POTASSIUM SERPL-SCNC: 4.4 MMOL/L (ref 3.5–5.3)
RBC # BLD AUTO: 4.46 X10*6/UL (ref 4–5.2)
RIGHT VENTRICLE PEAK SYSTOLIC PRESSURE: 45 MMHG
SODIUM SERPL-SCNC: 140 MMOL/L (ref 136–145)
WBC # BLD AUTO: 13.6 X10*3/UL (ref 4.4–11.3)

## 2024-06-10 PROCEDURE — 36415 COLL VENOUS BLD VENIPUNCTURE: CPT

## 2024-06-10 PROCEDURE — 97165 OT EVAL LOW COMPLEX 30 MIN: CPT | Mod: GO

## 2024-06-10 PROCEDURE — 2500000005 HC RX 250 GENERAL PHARMACY W/O HCPCS

## 2024-06-10 PROCEDURE — 97530 THERAPEUTIC ACTIVITIES: CPT | Mod: GO

## 2024-06-10 PROCEDURE — 93306 TTE W/DOPPLER COMPLETE: CPT

## 2024-06-10 PROCEDURE — 82947 ASSAY GLUCOSE BLOOD QUANT: CPT | Mod: 91

## 2024-06-10 PROCEDURE — 85025 COMPLETE CBC W/AUTO DIFF WBC: CPT

## 2024-06-10 PROCEDURE — 92610 EVALUATE SWALLOWING FUNCTION: CPT | Mod: GN | Performed by: SPEECH-LANGUAGE PATHOLOGIST

## 2024-06-10 PROCEDURE — 1100000001 HC PRIVATE ROOM DAILY

## 2024-06-10 PROCEDURE — 82947 ASSAY GLUCOSE BLOOD QUANT: CPT

## 2024-06-10 PROCEDURE — 2500000004 HC RX 250 GENERAL PHARMACY W/ HCPCS (ALT 636 FOR OP/ED)

## 2024-06-10 PROCEDURE — 97162 PT EVAL MOD COMPLEX 30 MIN: CPT | Mod: GP

## 2024-06-10 PROCEDURE — 83735 ASSAY OF MAGNESIUM: CPT

## 2024-06-10 PROCEDURE — 93306 TTE W/DOPPLER COMPLETE: CPT | Performed by: INTERNAL MEDICINE

## 2024-06-10 PROCEDURE — 2500000002 HC RX 250 W HCPCS SELF ADMINISTERED DRUGS (ALT 637 FOR MEDICARE OP, ALT 636 FOR OP/ED)

## 2024-06-10 PROCEDURE — 2500000001 HC RX 250 WO HCPCS SELF ADMINISTERED DRUGS (ALT 637 FOR MEDICARE OP): Performed by: STUDENT IN AN ORGANIZED HEALTH CARE EDUCATION/TRAINING PROGRAM

## 2024-06-10 PROCEDURE — 80069 RENAL FUNCTION PANEL: CPT | Performed by: INTERNAL MEDICINE

## 2024-06-10 RX ORDER — ALBUTEROL SULFATE 0.83 MG/ML
2.5 SOLUTION RESPIRATORY (INHALATION) EVERY 4 HOURS PRN
Status: DISCONTINUED | OUTPATIENT
Start: 2024-06-10 | End: 2024-06-11 | Stop reason: HOSPADM

## 2024-06-10 RX ORDER — GLYCOPYRROLATE 0.2 MG/ML
0.2 INJECTION INTRAMUSCULAR; INTRAVENOUS EVERY 4 HOURS PRN
Status: DISCONTINUED | OUTPATIENT
Start: 2024-06-10 | End: 2024-06-11 | Stop reason: HOSPADM

## 2024-06-10 RX ORDER — ATORVASTATIN CALCIUM 40 MG/1
40 TABLET, FILM COATED ORAL NIGHTLY
Status: DISCONTINUED | OUTPATIENT
Start: 2024-06-10 | End: 2024-06-10

## 2024-06-10 RX ORDER — SODIUM CHLORIDE 9 MG/ML
75 INJECTION, SOLUTION INTRAVENOUS CONTINUOUS
Status: DISCONTINUED | OUTPATIENT
Start: 2024-06-10 | End: 2024-06-10

## 2024-06-10 RX ORDER — HYOSCYAMINE SULFATE 0.12 MG/1
0.12 TABLET, ORALLY DISINTEGRATING ORAL EVERY 4 HOURS PRN
Status: DISCONTINUED | OUTPATIENT
Start: 2024-06-10 | End: 2024-06-11 | Stop reason: HOSPADM

## 2024-06-10 RX ORDER — HALOPERIDOL 5 MG/ML
1 INJECTION INTRAMUSCULAR EVERY 4 HOURS PRN
Status: DISCONTINUED | OUTPATIENT
Start: 2024-06-10 | End: 2024-06-11 | Stop reason: HOSPADM

## 2024-06-10 RX ORDER — FENTANYL CITRATE-0.9 % NACL/PF 1100MCG/55
PATIENT CONTROLLED ANALGESIA SYRINGE INJECTION CONTINUOUS
Status: DISCONTINUED | OUTPATIENT
Start: 2024-06-10 | End: 2024-06-11 | Stop reason: HOSPADM

## 2024-06-10 RX ORDER — ASPIRIN 300 MG/1
300 SUPPOSITORY RECTAL DAILY
Status: DISCONTINUED | OUTPATIENT
Start: 2024-06-10 | End: 2024-06-10

## 2024-06-10 RX ADMIN — FENTANYL CITRATE: 50 INJECTION, SOLUTION INTRAMUSCULAR; INTRAVENOUS at 17:47

## 2024-06-10 RX ADMIN — INSULIN LISPRO 1 UNITS: 100 INJECTION, SOLUTION INTRAVENOUS; SUBCUTANEOUS at 08:51

## 2024-06-10 RX ADMIN — GLYCOPYRROLATE 0.2 MG: 0.2 INJECTION, SOLUTION INTRAMUSCULAR; INTRAVENOUS at 22:31

## 2024-06-10 RX ADMIN — ASPIRIN 300 MG: 300 SUPPOSITORY RECTAL at 08:56

## 2024-06-10 RX ADMIN — METOPROLOL TARTRATE 5 MG: 5 INJECTION, SOLUTION INTRAVENOUS at 09:36

## 2024-06-10 RX ADMIN — METOPROLOL TARTRATE 5 MG: 5 INJECTION, SOLUTION INTRAVENOUS at 04:23

## 2024-06-10 RX ADMIN — PERFLUTREN 3 ML OF DILUTION: 6.52 INJECTION, SUSPENSION INTRAVENOUS at 10:20

## 2024-06-10 ASSESSMENT — PAIN - FUNCTIONAL ASSESSMENT
PAIN_FUNCTIONAL_ASSESSMENT: CPOT (CRITICAL CARE PAIN OBSERVATION TOOL)
PAIN_FUNCTIONAL_ASSESSMENT: UNABLE TO SELF-REPORT

## 2024-06-10 ASSESSMENT — COGNITIVE AND FUNCTIONAL STATUS - GENERAL
HELP NEEDED FOR BATHING: TOTAL
CLIMB 3 TO 5 STEPS WITH RAILING: TOTAL
DRESSING REGULAR UPPER BODY CLOTHING: TOTAL
DAILY ACTIVITIY SCORE: 6
EATING MEALS: TOTAL
MOVING TO AND FROM BED TO CHAIR: TOTAL
DAILY ACTIVITIY SCORE: 6
MOBILITY SCORE: 6
TOILETING: TOTAL
MOVING FROM LYING ON BACK TO SITTING ON SIDE OF FLAT BED WITH BEDRAILS: TOTAL
EATING MEALS: TOTAL
PERSONAL GROOMING: TOTAL
TOILETING: TOTAL
WALKING IN HOSPITAL ROOM: TOTAL
MOVING FROM LYING ON BACK TO SITTING ON SIDE OF FLAT BED WITH BEDRAILS: TOTAL
WALKING IN HOSPITAL ROOM: TOTAL
DRESSING REGULAR LOWER BODY CLOTHING: TOTAL
TURNING FROM BACK TO SIDE WHILE IN FLAT BAD: TOTAL
PERSONAL GROOMING: TOTAL
HELP NEEDED FOR BATHING: TOTAL
DRESSING REGULAR UPPER BODY CLOTHING: TOTAL
STANDING UP FROM CHAIR USING ARMS: TOTAL
CLIMB 3 TO 5 STEPS WITH RAILING: TOTAL
MOVING TO AND FROM BED TO CHAIR: TOTAL
STANDING UP FROM CHAIR USING ARMS: TOTAL
MOBILITY SCORE: 6
TURNING FROM BACK TO SIDE WHILE IN FLAT BAD: TOTAL
DRESSING REGULAR LOWER BODY CLOTHING: TOTAL

## 2024-06-10 ASSESSMENT — ACTIVITIES OF DAILY LIVING (ADL): BATHING_ASSISTANCE: TOTAL

## 2024-06-10 ASSESSMENT — PAIN SCALES - PAIN ASSESSMENT IN ADVANCED DEMENTIA (PAINAD): BODYLANGUAGE: RELAXED

## 2024-06-10 NOTE — PROGRESS NOTES
Occupational Therapy    Evaluation/Treatment    Patient Name: Carol Barajas  MRN: [id number]  : 1945  Today's Date: 06/10/24  Start Time: 2  End Time: 1458  Total Time 36 min    Assessment:  OT Assessment: Pt was dependent to complete bed mobility. Pt sat at the EOB for 25 minutes. Pts RUE is flaccid. Pt is unable to follow commands and has a left sided gaze.  Prognosis: Fair  Medical Staff Made Aware: Yes  End of Session Communication: Bedside nurse  End of Session Patient Position: Bed, 3 rail up, Alarm off, caregiver present, Alarm off, not on at start of session  OT Assessment Results: Decreased ADL status, Decreased upper extremity range of motion, Decreased upper extremity strength, Decreased cognition, Decreased endurance, Decreased sensation, Visual deficit, Decreased fine motor control, Decreased functional mobility, Decreased gross motor control, Decreased IADLs, Non-functional left upper extremity  Prognosis: Fair  Medical Staff Made Aware: Yes  Plan:  Treatment Interventions: ADL retraining, Visual perceptual retraining, Functional transfer training, UE strengthening/ROM, Endurance training, Cognitive reorientation, Patient/family training, Equipment evaluation/education, Neuromuscular reeducation, Fine motor coordination activities, Compensatory technique education  OT Frequency: 4 times per week  OT Discharge Recommendations: Moderate intensity level of continued care  OT Recommended Transfer Status: Assist of 2  OT - OK to Discharge: Yes (When medically ready)  Treatment Interventions: ADL retraining, Visual perceptual retraining, Functional transfer training, UE strengthening/ROM, Endurance training, Cognitive reorientation, Patient/family training, Equipment evaluation/education, Neuromuscular reeducation, Fine motor coordination activities, Compensatory technique education    Subjective   Current Problem:  1. Acute ischemic left MCA stroke (Multi)  Transthoracic Echo (TTE) Complete     Transthoracic Echo (TTE) Complete        General:   OT Received On: 06/10/24  General  Reason for Referral: Acute ischemic left MCA stroke (Multi).  Past Medical History Relevant to Rehab: PMHx of CAD, Afib (on Warfarin), VVIR pacemaker (placed on 8/25/2023, DM2 (diagnosed 8/2023 with A1c 7.6), C2-C6 cervical disc disease (significant b/l foraminal canal stenosis),  Family/Caregiver Present: Yes  Caregiver Feedback: Pts daughter and granddaughter came in during session. Family was supportive of session. Family tried to get pt to get to follow cues to follow cues to hold hand, but pt did not follow.  Prior to Session Communication: Bedside nurse  Patient Position Received: Bed, 3 rail up, Alarm off, not on at start of session  General Comment: Pt was awake upon arrival. Pt did not wake to verbal or tactile cues. When pt woke up she was not alert or oriented.  Precautions:  Precautions Comment: Aspiration precautions  Vital Signs:  Heart Rate: 88  Heart Rate Source: Monitor  Resp: 22 (Respiration rate was flucationing at the end of the session while sitting EOB, pt  was moved to a supine position.)  SpO2: 97 %  Patient Position: Sitting    Objective   Cognition:  Overall Cognitive Status: Unable to assess  Orientation Level: Unable to assess  Cognition Comment: Pts cognition is unable to assess at this point. During the session, the pt could not follow one-step commands (i.e., squeeze my hand). When speaking to the pt, she does not maintain eye contact and presents with a L sided gaze.   Home Living:  Type of Home: House  Home Living Comments: Unable to ask information at this time. Pts family reported that another family member is taking care of her house.  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; independent in ADLs is assumed for pts baseline  IADL History:  Type of Occupation: Nurse  ADL:  Eating Assistance: Total (Anticipate)  Grooming Assistance: Total  "(Anticipate)  Bathing Assistance: Total (Anticipate)  UE Dressing Assistance: Total (Anticipate)  LE Dressing Assistance: Total (Anticipate)  Toileting Assistance with Device: Total (Anticipate)  Functional Assistance: Total (Anticipate)  ADL Comments: Anticipate based on pt's inability to follow commands, left sided gaze, inability to complete AROM, and impaired alertness/orientation  Activities of Daily Living:   Grooming  Grooming Level of Assistance: Dependent  Grooming Where Assessed: Edge of bed  Grooming Comments: Pt had hair combed while family was interacting with the pt  Bed Mobility/Transfers:   Bed Mobility  Bed Mobility: Yes  Bed Mobility 1  Bed Mobility 1: Supine to sitting, Sitting to supine  Level of Assistance 1: Dependent, +2  Sitting Balance:  Static Sitting Balance  Static Sitting-Balance Support: Bilateral upper extremity supported, Feet supported  Static Sitting-Level of Assistance: Contact guard  Therapy/Activity:   Balance/Neuromuscular Re-Education  Balance/Neuromuscular Re-Education Activity Performed: Yes  Balance/Neuromuscular Re-Education Activity 1: Pt completed EOB sitting for 25min to engage the core and work on static sitting balance. During this time, the pts family was engaging with her and her hair was being combed.  Vision:Vision - Basic Assessment  Current Vision: Wears glasses all the time (Pt was wearing glasses upon arrival)   and Vision - Complex Assessment  Vision Comments: Pt had a left sided gaze. Pt daughter was standing in front of her and talking to her, pt was staring at her grandaughter that was standing to her left.  Sensation:  Sensation Comment: Pt was not awakened by by tactile cues. Unable to assess if R side had sensation deficits  Strength:  Strength Comments: Unable to assess. Pt unable to complete MMTs. Pt intermittently squeezed families hands, did not follow the command \"squeeze my hand\"  Coordination:  Movements are Fluid and Coordinated: No  Coordination " Comment: Unable to access   Hand Function:  Hand Function  Gross Grasp: Impaired (Pt unable to follow commands)  Coordination: Impaired (Pt unable to follow commands)  Extremities:   LUE  LUE : Exceptions to WFL (Pt unable to follow the command to assess AROM)  LUE PROM (degrees)  L Shoulder Flexion  0-170: 150 Degrees  L Elbow Flexion/Extension 0-135-150: 140 °  L Wrist Flexion 0-80: 75 Degrees  RUE: Exceptions to WFL   RUE PROM (degrees)  R Shoulder Flexion  0-170: 85 Degrees  R Elbow Flexion/Extension 0-135-150: 130 °  R Wrist Flexion 0-80: 75 Degrees  RUE Tone  RUE Tone: Flaccid    Outcome Measures: [facility] Daily Activity  Putting on and taking off regular lower body clothing: Total  Bathing (including washing, rinsing, drying): Total  Putting on and taking off regular upper body clothing: Total  Toileting, which includes using toilet, bedpan or urinal: Total  Taking care of personal grooming such as brushing teeth: Total  Eating Meals: Total  Daily Activity - Total Score: 6     and OT Adult Other Outcome Measures  4AT: Unable  to assess    Education Documentation  Body Mechanics, taught by WING Owens at 6/10/2024  4:02 PM.  Learner: Family, Patient  Readiness: Acceptance  Method: Explanation  Response: Verbalizes Understanding, Needs Reinforcement    ADL Training, taught by WING Owens at 6/10/2024  4:02 PM.  Learner: Family, Patient  Readiness: Acceptance  Method: Explanation  Response: Verbalizes Understanding, Needs Reinforcement    Education Comments  No comments found.    Treatment for pt involved to sitting at the EOB w/ CGA. Pt able to lean forward and correct her posture w/ CGA  OP EDUCATION:       Goals:  Encounter Problems       Encounter Problems (Active)       ADLs       Patient will perform UB and LB bathing with maximal assist level of assistance and shower chair.       Start:  06/10/24    Expected End:  07/01/24            Patient with complete upper body dressing with maximal  assist level of assistance donning and doffing all UE clothes with PRN adaptive equipment while supported sitting       Start:  06/10/24    Expected End:  07/01/24            Patient with complete lower body dressing with maximal assist level of assistance donning and doffing all LE clothes  with PRN adaptive equipment while edge of bed        Start:  06/10/24    Expected End:  07/01/24            Patient will complete toileting including hygiene clothing management/hygiene with maximal assist level of assistance and bedside commode.       Start:  06/10/24    Expected End:  07/01/24               BALANCE       Pt will maintain dynamic standing balance during ADL task with maximum assist level of assistance in order to demonstrate decreased risk of falling and improved postural control.       Start:  06/10/24    Expected End:  07/01/24               COGNITION/SAFETY       Pt will follow One step commands 25% of the time during OT tx session with mod  verbal/tactile/visual cues.       Start:  06/10/24    Expected End:  07/01/24               MOBILITY       Patient will perform Functional mobility min Household distances/Community Distances with maximum assist level of assistance and front wheeled walker in order to improve safety and functional mobility.       Start:  06/10/24    Expected End:  07/01/24               TRANSFERS       Patient will perform bed mobility maximum assist level of assistance in order to improve safety and independence with mobility       Start:  06/10/24    Expected End:  07/01/24            Patient will complete functional transfer to chair with front wheeled walker with maximum assist level of assistance.       Start:  06/10/24    Expected End:  07/01/24            Patient will complete sit to stand transfer with maximum assist level of assistance and front wheeled walker in order to improve safety and prepare for out of bed mobility.       Start:  06/10/24    Expected End:  07/01/24                    Gertrudis George S/OT

## 2024-06-10 NOTE — PROGRESS NOTES
Carol Barajas is a 79 y.o. female on day 2 of admission presenting with Acute ischemic left MCA stroke (Multi).    Subjective   Patient seen at bedside appearing drowsy, but responsive. Improved ability to follow commands today (blink eyes,  with L hand). However, limited effort of movement in RUE and RLE. Gaze crosses midline today and is able to respond to L and R sensation.     Objective     Last Recorded Vitals  Blood pressure (!) 138/92, pulse 73, temperature 36.9 °C (98.4 °F), temperature source Temporal, resp. rate 19, weight 70.5 kg (155 lb 6.8 oz), SpO2 97%.    Physical Exam  GENERAL APPEARANCE:  No distress, drowsy but cooperative.   CARDIOVASCULAR: Afib on monitor. Pulses +2 and equal in all extremities.  MENTAL STATE:  Patient remains aphasic. Somnolent but responsive to verbal stimulation, able to follow commands to  with L hand and blink.   CRANIAL NERVES:  Cranial nerves were normal.      CN 2- No blink to threat on R (R HH).      CN 3, 4, 6-  Pupils round, 4 mm in diameter, equally reactive to light. No ptosis, R gaze palsy, able to cross midline today.     CN 5- Unable to assess due to AMS     CN 7- Unable to assess due to AMS     CN 8- Unable to assess due to AMS      CN 9- Unable to assess due to AMS      CN 11- Unable to assess due to AMS      CN 12- Unable to assess due to AMS   MOTOR:    RUE flicker withdrawal to pain  RLE flicker withdrawal to pain  LUE/LLE spontaneously AG  No fasciculations, tremor or other abnormal movements were present  REFLEXES:  Left: Biceps 2/2, brachioradialis 2/2, triceps 2/2, patellar 1/2, ankle 1/2 - Babinski: toes downgoing to plantar stimulation. No clonus.  Right: Biceps 1/2, brachioradialis 2/2, triceps 1/2, patellar 0/2, ankle 1/2 - Babinski: toes up going to plantar stimulation.  SENSORY: Localizes with nox stim in all extremities  COORDINATION: Unable to assess due to AMS    GAIT: Deferred for patient's safety      Neurological Exam  Relevant  Results  1a. LOC  1b. Month and Age  1c. Blink and Make fist  2 Best Gaze  3. Visual  4. Facial Palsy  5A. Motor - L Arm  5B. Motor - R Arm  6A. Motor - L Leg  6B. Motor - R Leg  7. Limb Ataxia  8. Sensory Loss  9. Best Language  10. Dysarthria  11. Extinction and Inattention   Total NIHSS 0  2  1  1  1  2  2  4  2  4  0  0  3  1  0  23                  [place] Coma Scale  Best Eye Response: To verbal stimuli  Best Verbal Response: None  Best Motor Response: Follows commands  [place] Coma Scale Score: 10           Scheduled medications  aspirin, 300 mg, rectal, Daily  atorvastatin, 40 mg, oral, Nightly  insulin lispro, 0-5 Units, subcutaneous, q4h  metoprolol, 5 mg, intravenous, q6h  [Held by provider] sennosides-docusate sodium, 2 tablet, oral, Nightly      Continuous medications  sodium chloride 0.9%, 75 mL/hr, Last Rate: 75 mL/hr (06/10/24 0800)      PRN medications  PRN medications: acetaminophen, dextrose, dextrose, glucagon, glucagon, hydrALAZINE **FOLLOWED BY** hydrALAZINE, labetaloL, oxygen, polyethylene glycol    Results for orders placed or performed during the hospital encounter of 06/08/24 (from the past 24 hour(s))   POCT GLUCOSE   Result Value Ref Range    POCT Glucose 230 (H) 74 - 99 mg/dL   POCT GLUCOSE   Result Value Ref Range    POCT Glucose 184 (H) 74 - 99 mg/dL   POCT GLUCOSE   Result Value Ref Range    POCT Glucose 122 (H) 74 - 99 mg/dL   POCT GLUCOSE   Result Value Ref Range    POCT Glucose 154 (H) 74 - 99 mg/dL   Magnesium   Result Value Ref Range    Magnesium 1.99 1.60 - 2.40 mg/dL   Renal function panel   Result Value Ref Range    Glucose 160 (H) 74 - 99 mg/dL    Sodium 140 136 - 145 mmol/L    Potassium 4.4 3.5 - 5.3 mmol/L    Chloride 105 98 - 107 mmol/L    Bicarbonate 24 21 - 32 mmol/L    Anion Gap 15 10 - 20 mmol/L    Urea Nitrogen 23 6 - 23 mg/dL    Creatinine 0.66 0.50 - 1.05 mg/dL    eGFR 90 >60 mL/min/1.73m*2    Calcium 9.6 8.6 - 10.6 mg/dL    Phosphorus 3.5 2.5 - 4.9 mg/dL    Albumin  4.1 3.4 - 5.0 g/dL   POCT GLUCOSE   Result Value Ref Range    POCT Glucose 142 (H) 74 - 99 mg/dL   CBC and Auto Differential   Result Value Ref Range    WBC 13.6 (H) 4.4 - 11.3 x10*3/uL    nRBC 0.0 0.0 - 0.0 /100 WBCs    RBC 4.46 4.00 - 5.20 x10*6/uL    Hemoglobin 13.3 12.0 - 16.0 g/dL    Hematocrit 39.4 36.0 - 46.0 %    MCV 88 80 - 100 fL    MCH 29.8 26.0 - 34.0 pg    MCHC 33.8 32.0 - 36.0 g/dL    RDW 13.1 11.5 - 14.5 %    Platelets 186 150 - 450 x10*3/uL    Neutrophils % 87.4 40.0 - 80.0 %    Immature Granulocytes %, Automated 0.6 0.0 - 0.9 %    Lymphocytes % 4.6 13.0 - 44.0 %    Monocytes % 7.2 2.0 - 10.0 %    Eosinophils % 0.0 0.0 - 6.0 %    Basophils % 0.2 0.0 - 2.0 %    Neutrophils Absolute 11.86 (H) 1.60 - 5.50 x10*3/uL    Immature Granulocytes Absolute, Automated 0.08 0.00 - 0.50 x10*3/uL    Lymphocytes Absolute 0.62 (L) 0.80 - 3.00 x10*3/uL    Monocytes Absolute 0.97 (H) 0.05 - 0.80 x10*3/uL    Eosinophils Absolute 0.00 0.00 - 0.40 x10*3/uL    Basophils Absolute 0.03 0.00 - 0.10 x10*3/uL   POCT GLUCOSE   Result Value Ref Range    POCT Glucose 164 (H) 74 - 99 mg/dL   Transthoracic Echo (TTE) Complete   Result Value Ref Range    AV pk oneil 1.23 m/s    LVOT diam 2.00 cm    RVSP 45.0 mmHg    Aortic Valve Area by Continuity of Peak Velocity 2.49 cm2    AV pk grad 6.1 mmHg     CT head wo IV contrast    Result Date: 6/9/2024  Interpreted By:  Kael Henry, STUDY: CT HEAD WO IV CONTRAST; ;  6/9/2024 6:47 am   INDICATION: Signs/Symptoms:Post thrombectomy.   COMPARISON: CT head from 06/08/2024.   ACCESSION NUMBER(S): [id number]   ORDERING CLINICIAN: JARVIS KRUGER   TECHNIQUE: Routine unenhanced CT of the head was performed.   FINDINGS: There is new hyperattenuation in multiple regions involving the left cerebral hemisphere including within the frontal, parietal, and temporal lobes as well as the basal ganglia which may reflect contrast staining versus blood products. There is new surrounding sulcal effacement related  to evolving infarct. There is no striking midline shift. Basilar cisterns and ventricles are essentially unchanged in size.   No acute transcortical infarct or hemorrhage is seen within the right cerebral hemisphere.   The calvarium is unremarkable in appearance. Small mucosal cyst within the left maxillary sinus.       Evolving left MCA territory infarct with new regions of hyperattenuation which may reflect contrast staining versus blood products. There is new surrounding mass effect including sulcal effacement. There is no midline shift.   This study was interpreted at [facility].   MACRO: None   Signed by: Kael Henry 6/9/2024 1:08 PM Dictation workstation:   [id number]    CT brain attack head wo IV contrast    Result Date: 6/8/2024  Interpreted By:  Daniel Morgan and Afshari Mirak Sohrab STUDY: CT BRAIN ATTACK HEAD WO IV CONTRAST; CT BRAIN ATTACK ANGIO HEAD AND NECK W AND WO IV CONTRAST;  6/8/2024 9:57 pm   INDICATION: Signs/Symptoms:stroke.   COMPARISON: None.   ACCESSION NUMBER(S): [id number]; [id number]   ORDERING CLINICIAN: NICHOLE SHAFER   TECHNIQUE: Unenhanced CT images of the head were obtained. Subsequently, 75 ML of Omnipaque 350 was administered intravenously and axial images of the head and neck were acquired.  Coronal, sagittal, and 3-D reconstructions were provided for review.   FINDINGS:   CT HEAD:   CSF Spaces: No hydrocephalus. Mild generalized cerebral volume loss and associated ventricular and sulcal enlargement.. Evaluation of sulci/extra-axial spaces somewhat limited due to recent intravenous contrast administration. No evidence of extra-axial collections.   Parenchyma: There are periventricular and subcortical white matter hypodensities which are nonspecific but likely represent sequela of mild to moderate small-vessel ischemic changes. The grey-white differentiation is intact. There is no mass effect or midline shift. There is no intracranial  hemorrhage.   Calvarium: The calvarium is unremarkable.   Paranasal sinuses and mastoids: Visualized paranasal sinuses and mastoids are clear.   CTA HEAD FINDINGS:   Anterior circulation: Bilateral intracranial internal carotid arteries are widely patent with scattered atherosclerotic calcifications of the carotid siphons without stenosis. Bilateral anterior cerebral arteries are patent. There is abrupt cut off the proximal left M1. The right MCA is patent.   Posterior circulation: Bilateral intracranial vertebral arteries, vertebrobasilar junction, basilar artery and proximal posterior cerebral arteries are patent without focal stenosis.   CTA NECK FINDINGS:   Right carotid vessels: The common carotid artery is normal. The carotid bifurcation is normal. The internal carotid artery in the neck is normal. No significant stenosis.   Left carotid vessels: The common carotid artery is normal. The carotid bifurcation is normal. The internal carotid artery in the neck is normal. No significant stenosis.   Vertebral vessels:  The visualized segments of the cervical vertebral arteries are normal in caliber.       Occlusive thrombus of the proximal left M1 portion of the MCA. No significant stenosis of the remainder of the large vessels.   No evidence of large territorial infarct on the noncontrast portion of the CT scan.   I personally reviewed the images/study and I agree with the findings as stated by resident physician Dr. Deondre Sky . This study was interpreted at [facility], [place], [place].   MACRO: Deondre Sky discussed the significance and urgency of this critical finding by telephone with  NICHOLE HOLLIS on 6/8/2024 at 10:11 pm.  (**-RCF-**) Findings:  See findings.   Signed by: Daniel Morgan 6/8/2024 10:28 PM Dictation workstation:   [id number]    CT brain attack angio head and neck W and WO IV contrast    Result Date: 6/8/2024  Interpreted By:  Eddie  Daniel  and [facility] Deondre STUDY: CT BRAIN ATTACK HEAD WO IV CONTRAST; CT BRAIN ATTACK ANGIO HEAD AND NECK W AND WO IV CONTRAST;  6/8/2024 9:57 pm   INDICATION: Signs/Symptoms:stroke.   COMPARISON: None.   ACCESSION NUMBER(S): [id number]; [id number]   ORDERING CLINICIAN: NICHOLE SHAFER   TECHNIQUE: Unenhanced CT images of the head were obtained. Subsequently, 75 ML of Omnipaque 350 was administered intravenously and axial images of the head and neck were acquired.  Coronal, sagittal, and 3-D reconstructions were provided for review.   FINDINGS:   CT HEAD:   CSF Spaces: No hydrocephalus. Mild generalized cerebral volume loss and associated ventricular and sulcal enlargement.. Evaluation of sulci/extra-axial spaces somewhat limited due to recent intravenous contrast administration. No evidence of extra-axial collections.   Parenchyma: There are periventricular and subcortical white matter hypodensities which are nonspecific but likely represent sequela of mild to moderate small-vessel ischemic changes. The grey-white differentiation is intact. There is no mass effect or midline shift. There is no intracranial hemorrhage.   Calvarium: The calvarium is unremarkable.   Paranasal sinuses and mastoids: Visualized paranasal sinuses and mastoids are clear.   CTA HEAD FINDINGS:   Anterior circulation: Bilateral intracranial internal carotid arteries are widely patent with scattered atherosclerotic calcifications of the carotid siphons without stenosis. Bilateral anterior cerebral arteries are patent. There is abrupt cut off the proximal left M1. The right MCA is patent.   Posterior circulation: Bilateral intracranial vertebral arteries, vertebrobasilar junction, basilar artery and proximal posterior cerebral arteries are patent without focal stenosis.   CTA NECK FINDINGS:   Right carotid vessels: The common carotid artery is normal. The carotid bifurcation is normal. The internal carotid artery in the neck is normal.  No significant stenosis.   Left carotid vessels: The common carotid artery is normal. The carotid bifurcation is normal. The internal carotid artery in the neck is normal. No significant stenosis.   Vertebral vessels:  The visualized segments of the cervical vertebral arteries are normal in caliber.       Occlusive thrombus of the proximal left M1 portion of the MCA. No significant stenosis of the remainder of the large vessels.   No evidence of large territorial infarct on the noncontrast portion of the CT scan.   I personally reviewed the images/study and I agree with the findings as stated by resident physician Dr. Deondre Sky . This study was interpreted at [facility], [place], [place].   MACRO: Deondre Sky discussed the significance and urgency of this critical finding by telephone with  NICHOLE ROLAKISHA on 6/8/2024 at 10:11 pm.  (**-RCF-**) Findings:  See findings.   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 [place] [facility] [place] NIHSS 19 (2-LOC question, 1-LOC command, 2-gaze, 2-visual, 2- facial, 3-RUE, 3-RLE, 2-dysarthria, 2-aphasia), [facility] CTA with L M1 occlusion. Revascularization achieved at 22:28 with TICI 2b. Post thrombectomy NIHSS 20 (gaze improved to 1 but LLE changed from 0 to 2 due to not able to follow command). Exam today similar with NIHSS 23.     Stroke Classification:  Type: Ischemic stroke  Subtype/etiology: Large vessel occlusion    Vessels involved: L MCA (M1)  Neurological manifestations: R hemiplegia, aphasia, L gaze  Pre-Intervention Deficits: NIHSS 19 (2-LOC question, 1-LOC command, 2-gaze, 2-visual, 2- facial, 3-RUE, 3-RLE, 2-dysarthria, 2-aphasia)  Post-Intervention Deficits: NIHSS 20 (2-LOC question, 2-LOC command, 1-gaze, 2-visual, 2- facial, 3-RUE, 3-RLE, 2-LLE, 2-dysarthria, 2-aphasia)  Pre-stroke mRS: 0  Initial treatment: TNK  Vascular Risk Factors: D2M, Afib, CAD  Antiplatelet/antithrombotic plan for stroke prevention: aspirin, warfarin held  VTE prophylaxis: Lovenox   Vascular Risk Factor modification goals:  Blood pressure goals: avoid hypotension SBP <100 that could worsen cerebral perfusion, Ischemic stroke post-thrombectomy <160   Lipid Goals: education on healthy diet and statin therapy to maintain or achieve goal LDL-cholesterol < 70mg  Glucose Goals: early treatment of hyperglycemia to goal glucose 140-180 mg/dl with long-term goal A1c < 7%   Smoking Cessation and Education  Assessment for Rehabilitation needs   Patient and family education on signs and symptoms of stroke, calling 911, healthy strategies for stroke prevention.       RECOMMENDATIONS:  NEURO:  #L MCA stroke  ::, HbA1c 8.2,    ::Etiology: cardioembolic in setting of Afib  Assessment  - 6/8: TNK, thrombectomy TICI 2b, post revascularization NIHSS 20   - CTH with small L BG bleed vs contrast extravasation  - NIHSS remains severely elevated at 23  Plan  - MRI brain when able (Patient has pacemaker)  - s/p aspirin 300 mg suppository 24-hr post TNK  - sBP < 160  - Start atorvastatin 40 mg  - SBP goal <160  - Hold home warfarin given TNK  - Pending SLP/PT/OT     #Afib  #CAD  Assessment  - TTE shows Afib with normal LV function, no LV thrombus  Plan  - RVR < 120, if greater than 120, give additional 5 mg IV metoprolol  - IV metoprolol 5 mg q6hrs  - Will transition to home oral 25 mg metoprolol BID PO    #Diabetes  Plan  - Sliding scale insulin  -  AccuChecks  - Holding home oral antiglycemics    #Leukocytosis  - Likely reactive  Plan  - Monitor for s/s of infection    F: NS 75cc/hr  E: Replete PRN  N: npo pending DHT  DVT pp: Lovenox, SCDs  GI ppx: None    Code status: DNR  NOK: Daughter Katya Baker ()     JULIOCESAR HENRIQUEZ  Medical Student, MS3   I participated and contributed to the above medical student note including the HPI, physical exam, assessment, and plan. I agree with the above information, with the following additions.

## 2024-06-10 NOTE — PROGRESS NOTES
Speech-Language Pathology  Adult Inpatient Clinical Bedside Swallow Evaluation    Patient Name: Carol Barajas  MRN: [id number]  Today's Date: 6/10/2024   Start Time: 1015  Stop Time: 1045  Time Calculation (min): 30    History of Present Illness:   Carol Barajas is a 79 y.o. RH female w/ PMHx of CAD, Afib (on Warfarin), VVIR pacemaker (placed on 8/25/2023, DM2 (diagnosed 8/2023 with A1c 7.6), C2-C6 cervical disc disease (significant b/l foraminal canal stenosis), presenting [place] a [place] [facility] [place] L MCA stroke syndrome, LKW 19:00 pm, s/p TNK @ 20:17.     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 She was taken to ED where work up revealed L M1 occlusion. Vitals: 161/104, HR  (irregular), Glucose 134, INR 1, NIHSS 25 (break down not available). TNK was administered at 20:17 pm and she was transferred to [facility] to be evaluated for thrombectomy.      BAT paged around 21:06 pm and patient arrived to ED around 21:10 pm. Images from [facility] [place] still unavailable for review. /112, NIHSS 19 (2-LOC question, 1-LOC command, 2-gaze, 2-visual, 2- facial, 3-RUE, 3-RLE, 2-dysarthria, 2-aphasia), repeat CTH with ASPECT score of 9 (L insula), CTA with L M1 occlusion. Still unable to reach family by phone so 2 physician consent was obtained for thrombectomy (Dr. Reji Banda and Dr. TEOFILO Huitron). Revascularization achieved at 22:28 with TICI 2b. Post thrombectomy NIHSS 20 (gaze improved to 1 but LLE changed from 0 to 2 due to not able to follow command). Expert CTH with small L BG bleed vs contrast extravasation.     Patient admitted to [facility] where repeat NIHSS remained 20. Patients daughter arrived to [facility] and reported that patient is completely independent at baseline and the only medication she takes is warfarin.      Unable to obtain ROS due to patient's AMS.    Assessment:   Clinical  bedside swallow evaluation completed. A&Ox0. Unable to follow commands to complete oral motor examination or produce a volitional cough prior to PO trials. Ice chip x1 placed in oral cavity with limited oral manipulation; loss of ice chip bolus anteriorly from oral cavity. Pt unable to extract liquids via spoon or straw despite max cues/attempts. No further trials given 2/2 severity/risk of aspiration. Pt will likely require instrumental swallow assessment; contraindicated at this time given severity of swallow function and inability to follow commands to safely participate. SLP recommends continue NPO with frequent aggressive oral care. Question need for alternate means nutrition/hydration vs. Ongoing GOC discussions with HPOA re: nutritional status/continuing to consume PO despite the risks of aspiration. Nursing aware of recommendations. Will continue to follow while in acute care setting.      Recommendations:  NPO with frequent aggressive oral care     Goal:   Pt will tolerate least restrictive diet with no overt clinical s/s aspiration 100% of the time.        Plan:  SLP Services Indicated: Yes  Frequency: 2x week  Discussed POC with patient  SLP - OK to Discharge    Pain:   0-10  0 = No pain.     Inpatient Education:  Extensive education provided to patient regarding current swallow function, recommendations/results, and POC.      Consultations/Referrals/Coordination of Services:   N/A

## 2024-06-10 NOTE — CARE PLAN
Problem: General Stroke  Goal: Demonstrate improvement in neurological exam throughout the shift  Outcome: Progressing  Goal: Maintain BP within ordered limits throughout shift  Outcome: Progressing  Goal: No symptoms of aspiration throughout shift  Outcome: Progressing     Problem: Safety  Goal: Patient will be injury free during hospitalization  Outcome: Progressing     Problem: Skin  Goal: Prevent/manage excess moisture  Outcome: Progressing

## 2024-06-10 NOTE — PROGRESS NOTES
Care Transitions Progress Note:  Plan per medical team: stroke workup;PT:OT ; Sp  Team Members Present: NP: MD: TCC:: RODO  Status: inpatient  Payor:  [facility] and Medicare  Barriers: none  Adod: 5 days

## 2024-06-10 NOTE — PROGRESS NOTES
Physical Therapy    Physical Therapy Evaluation    Patient Name: Carol Barajsa  MRN: [id number]  Today's Date: 6/10/2024   Room: [address] [place], [place] [address]  Time Calculation  Start Time: 1328  Stop Time: 1344  Time Calculation (min): 16 min    Assessment/Plan   PT Assessment  PT Assessment Results: Decreased strength, Decreased endurance, Impaired balance, Decreased mobility, Decreased cognition  Rehab Prognosis: Fair  Evaluation/Treatment Tolerance: Patient tolerated treatment well  End of Session Communication: Bedside nurse  Assessment Comment: Pt to benefit from ongoing PT services to address the above limitations and to maxmimize safety/independence with mobility.  End of Session Patient Position: Bed, 3 rail up, Alarm off, not on at start of session  IP OR SWING BED PT PLAN  Inpatient or Swing Bed: Inpatient  PT Plan  Treatment/Interventions: Bed mobility, Transfer training, Gait training, Stair training, Balance training, Neuromuscular re-education, Strengthening, Endurance training, Range of motion, Therapeutic exercise, Therapeutic activity, Home exercise program, Orthotic fitting/training, Positioning, Postural re-education  PT Plan: Skilled PT  PT Frequency: 5 times per week  PT Discharge Recommendations: High intensity level of continued care  Equipment Recommended upon Discharge:  (TBD)  PT Recommended Transfer Status: Total assist  PT - OK to Discharge: Yes (When medically ready)      Subjective   General Visit Information:  Reason for Referral: Transfer from [facility] with L MCA stroke syndrome, 6/8 s/p TNK 20:17 and thrombectomy TICI 2b 22:30. 6/10: GCS 10, NIHSS 22.  Past Medical History Relevant to Rehab: CAD, Afib (on Warfarin), VVIR pacemaker (placed on 8/25/2023, DM2 (diagnosed 8/2023 with A1c 7.6), C2-C6 cervical disc disease (significant b/l foraminal canal stenosis)  Prior to Session Communication: Bedside nurse  Patient Position Received: Bed, 3 rail up, Alarm off, not on at start of session  Family/Caregiver  Present: No  Caregiver Feedback: n/a   Home Living:  Home Living  Home Living Comments: Unable to obtain subjective history details this date.  Prior Level of Function:  Prior Function Per Pt/Caregiver Report  Prior Function Comments: Unable to obtain subjective history details this date.  Precautions:  Precautions  Hearing/Visual Limitations: Glasses. Hearing difficult to formally assess 2/2 communication limitations  Medical Precautions: Fall precautions  Precautions Comment: SBP <160  Vital Signs:  Vital Signs  Heart Rate: 90 (97 during, 90 post)  SpO2: 99 % (98-99%)  BP: (!) 164/96 (Decreases to 160/104 (121) with upright positioning in bed. 135/80 (98) after returning to supine from sitting at edge of bed.)  MAP (mmHg): 116  BP Method: Automatic    Objective   Lines/Tubes/Drains:  External Urinary Catheter Female (Active)   Number of days: 0     Continuous Medications/Drips:  fentaNYL,       Oxygen:    Pain:  Pain Assessment  Pain Assessment: Unable to self-report (no signs of pain during session)  Cognition:  Cognition  Overall Cognitive Status: Impaired  Arousal/Alertness: Localized responses  Orientation Level: Unable to assess (Pt makes eye contact in response to name.)  Following Commands:  (Pt does not follow commands.)  Cognition Comments: No attempts to verbalize, nod, or gesture.  Insight: No response  Impulsive: Within functional limits  Processing Speed: Delayed      Extremity/Trunk Assessments:  Strength:     RUE   RUE :  (PROM WFL, strength assessment limited by decreased command following. No spontaneous movement or movement on command.)  LUE   LUE: Exceptions to WFL (PROM WFL)  LUE Strength  LUE Overall Strength:  (Based on observation of spontaneous movement)  L Shoulder Flexion: 2+/5  L Elbow Flexion: 3/5  RLE   RLE :  (PROM WFL, strength assessment limited by decreased command following. No spontaneous movement or movement on command.)  LLE   LLE :  (PROM WFL, strength assessment limited by  "decreased command following. No spontaneous movement or movement on command.)    General Assessments:  Strength  Strength Comments: Unable to assess. Pt unable to complete MMTs. Pt did not follow the command \"squeeze my hand\"      Activity Tolerance  Early Mobility/Exercise Safety Screen: Proceed with mobilization - No exclusion criteria met  Sensation  Sensation Comment: Withdrawal to nox stim: Flicker RUE. Intact LUE and BLE.  Coordination  Movements are Fluid and Coordinated: No  Static Sitting Balance  Static Sitting-Balance Support: Bilateral upper extremity supported, Feet supported  Static Sitting-Level of Assistance:  (MIN A x1 progressing to CGA)  Static Sitting-Comment/Number of Minutes: 3 min  Dynamic Sitting Balance  Dynamic Sitting-Balance Support: Bilateral upper extremity supported, Feet supported  Dynamic Sitting-Balance: Lateral lean  Dynamic Sitting-Comments: MAX A x1 to correct R lateral lean.  Static Standing Balance  Static Standing-Comment/Number of Minutes: Deferred standing 2/2 limited command following. 3    Functional Assessments:  Bed Mobility  Bed Mobility: Yes  Bed Mobility 1  Bed Mobility 1: Scooting (Boosting)  Level of Assistance 1: Dependent (x2)  Bed Mobility Comments 1: HOB flat, used draw sheet.  Bed Mobility 2  Bed Mobility  2: Supine to sitting, Sitting to supine  Level of Assistance 2: Dependent (x1)  Bed Mobility Comments 2: Sup to sit HOB elevated, sit to sup HOB flat. Verbal/tactile cues to maxmize pt participation in task.  Transfers  Transfer: No  Ambulation/Gait Training  Ambulation/Gait Training Performed: No  Stairs  Stairs: No       Outcome Measures:  [facility] Basic Mobility  Turning from your back to your side while in a flat bed without using bedrails: Total  Moving from lying on your back to sitting on the side of a flat bed without using bedrails: Total  Moving to and from bed to chair (including a wheelchair): Total  Standing up from a chair using your arms (e.g. " wheelchair or bedside chair): Total  To walk in hospital room: Total  Climbing 3-5 steps with railing: Total  Basic Mobility - Total Score: 6              ICU Mobility Screen  Early Mobility/Exercise Safety Screen: Proceed with mobilization - No exclusion criteria met                Encounter Problems       Encounter Problems (Active)       PT Problem       Patient will perform bed mobility with </= MOD A x1 to reduce risk of developing decubitus ulcers.        Start:  06/10/24    Expected End:  06/24/24            Patient will perform sit to stand and stand to sit transfers with </= MOD A x2 and LRD to increase functional strength.        Start:  06/10/24    Expected End:  06/24/24            Patient will stand with UE support of LRD and </= MOD A x2 at least 2 min to improve balance required for self-care tasks.        Start:  06/10/24    Expected End:  06/24/24            LLE >/= 3+/5, RLE >/= 3-/5        Start:  06/10/24    Expected End:  06/24/24            Patient will participate in therapeutic exercise program with VSS and cues as needed.       Start:  06/10/24    Expected End:  06/24/24                   Education Documentation  Precautions, taught by Shawna Mann PT at 6/10/2024  4:24 PM.  Learner: Patient  Readiness: Nonacceptance  Method: Explanation, Demonstration  Response: No Evidence of Learning    Mobility Training, taught by Shawna Mann PT at 6/10/2024  4:24 PM.  Learner: Patient  Readiness: Nonacceptance  Method: Explanation, Demonstration  Response: No Evidence of Learning    Education Comments  No comments found.            06/10/24 at 4:25 PM   Shawna Mann PT   Rehab Office: [phone]

## 2024-06-10 NOTE — PROGRESS NOTES
Social Work Discharge Planning note:    SW was informed by the medical team that family has opted for Pt to transition to comfort care with hospice. SW met with Pt's daughter/medical POA, Katya, to offer support and to further discuss hospice. After discussing hospice service, hospice levels of care, and providers, Katya was agreeable with hospice, giving [facility] as provider preference. Katya reported there to be no other issues or concerns at this time.  A referral has been made to R and they will contact Katya to set up an appointment to meet. SW will continue to follow for emotional/incidental support to Pt/family.      Akash Canales, MSW, LSW

## 2024-06-11 ENCOUNTER — HOSPITAL ENCOUNTER (INPATIENT)
Facility: HOSPITAL | Age: 79
LOS: 1 days | Discharge: HOSPICE | End: 2024-06-12
Attending: STUDENT IN AN ORGANIZED HEALTH CARE EDUCATION/TRAINING PROGRAM | Admitting: STUDENT IN AN ORGANIZED HEALTH CARE EDUCATION/TRAINING PROGRAM
Payer: OTHER MISCELLANEOUS

## 2024-06-11 VITALS
HEART RATE: 95 BPM | RESPIRATION RATE: 18 BRPM | SYSTOLIC BLOOD PRESSURE: 164 MMHG | DIASTOLIC BLOOD PRESSURE: 92 MMHG | OXYGEN SATURATION: 97 % | TEMPERATURE: 97.5 F | WEIGHT: 155.42 LBS | BODY MASS INDEX: 25.86 KG/M2

## 2024-06-11 DIAGNOSIS — Z51.5 END OF LIFE CARE: ICD-10-CM

## 2024-06-11 PROCEDURE — 2500000004 HC RX 250 GENERAL PHARMACY W/ HCPCS (ALT 636 FOR OP/ED): Performed by: STUDENT IN AN ORGANIZED HEALTH CARE EDUCATION/TRAINING PROGRAM

## 2024-06-11 PROCEDURE — 1150000001 HC HOSPICE PRIVATE ROOM DAILY

## 2024-06-11 RX ORDER — FENTANYL CITRATE-0.9 % NACL/PF 1100MCG/55
PATIENT CONTROLLED ANALGESIA SYRINGE INJECTION CONTINUOUS
Status: CANCELLED | OUTPATIENT
Start: 2024-06-11

## 2024-06-11 RX ORDER — HALOPERIDOL 5 MG/ML
1 INJECTION INTRAMUSCULAR EVERY 4 HOURS PRN
Status: CANCELLED | OUTPATIENT
Start: 2024-06-11

## 2024-06-11 RX ORDER — GLYCOPYRROLATE 0.2 MG/ML
0.2 INJECTION INTRAMUSCULAR; INTRAVENOUS EVERY 4 HOURS PRN
Status: DISCONTINUED | OUTPATIENT
Start: 2024-06-11 | End: 2024-06-12

## 2024-06-11 RX ORDER — ACETAMINOPHEN 325 MG/1
650 TABLET ORAL EVERY 6 HOURS PRN
Status: DISCONTINUED | OUTPATIENT
Start: 2024-06-11 | End: 2024-06-12

## 2024-06-11 RX ORDER — HALOPERIDOL 5 MG/ML
1 INJECTION INTRAMUSCULAR EVERY 4 HOURS PRN
Status: DISCONTINUED | OUTPATIENT
Start: 2024-06-11 | End: 2024-06-12

## 2024-06-11 RX ORDER — ACETAMINOPHEN 325 MG/1
650 TABLET ORAL EVERY 6 HOURS PRN
Status: CANCELLED | OUTPATIENT
Start: 2024-06-11

## 2024-06-11 RX ORDER — ALBUTEROL SULFATE 0.83 MG/ML
2.5 SOLUTION RESPIRATORY (INHALATION) EVERY 4 HOURS PRN
Status: DISCONTINUED | OUTPATIENT
Start: 2024-06-11 | End: 2024-06-13 | Stop reason: HOSPADM

## 2024-06-11 RX ORDER — FENTANYL CITRATE-0.9 % NACL/PF 1100MCG/55
PATIENT CONTROLLED ANALGESIA SYRINGE INJECTION CONTINUOUS
Status: DISCONTINUED | OUTPATIENT
Start: 2024-06-11 | End: 2024-06-12

## 2024-06-11 RX ORDER — HYOSCYAMINE SULFATE 0.12 MG/1
0.12 TABLET, ORALLY DISINTEGRATING ORAL EVERY 4 HOURS PRN
Status: CANCELLED | OUTPATIENT
Start: 2024-06-11

## 2024-06-11 RX ORDER — HYOSCYAMINE SULFATE 0.12 MG/1
0.12 TABLET, ORALLY DISINTEGRATING ORAL EVERY 4 HOURS PRN
Status: DISCONTINUED | OUTPATIENT
Start: 2024-06-11 | End: 2024-06-12 | Stop reason: SDUPTHER

## 2024-06-11 RX ORDER — ALBUTEROL SULFATE 0.83 MG/ML
2.5 SOLUTION RESPIRATORY (INHALATION) EVERY 4 HOURS PRN
Status: CANCELLED | OUTPATIENT
Start: 2024-06-11

## 2024-06-11 RX ORDER — POLYETHYLENE GLYCOL 3350 17 G/17G
17 POWDER, FOR SOLUTION ORAL DAILY PRN
Status: CANCELLED | OUTPATIENT
Start: 2024-06-11

## 2024-06-11 RX ORDER — GLYCOPYRROLATE 0.2 MG/ML
0.2 INJECTION INTRAMUSCULAR; INTRAVENOUS EVERY 4 HOURS PRN
Status: CANCELLED | OUTPATIENT
Start: 2024-06-11

## 2024-06-11 RX ORDER — POLYETHYLENE GLYCOL 3350 17 G/17G
17 POWDER, FOR SOLUTION ORAL DAILY PRN
Status: DISCONTINUED | OUTPATIENT
Start: 2024-06-11 | End: 2024-06-13 | Stop reason: HOSPADM

## 2024-06-11 ASSESSMENT — RESPIRATORY DISTRESS OBSERVATION SCALE (RDOS)
ACCESSORY MUSCLE RISE IN CLAVICLE DURING INSPIRATION: 0 - NONE
RESTLESS NONPURPOSEFUL MOVEMENTS: 2 - FREQUENT MOVEMENTS
RESTLESS NONPURPOSEFUL MOVEMENTS: 2 - FREQUENT MOVEMENTS
LOOK OF FEAR: 0 - NONE
GRUNTING AT END OF EXPIRATION: 0 - NONE
RESPIRATORY RATE PER MINUTE: 1 - 19-30 BREATHS
HEART RATE PER MINUTE: 1 - 90-109 BEATS
PARADOXICAL BREATHING PATTERN: 0 - NONE
RDOS TOTAL SCORE: 3
ACCESSORY MUSCLE RISE IN CLAVICLE DURING INSPIRATION: 0 - NONE
RESTLESS NONPURPOSEFUL MOVEMENTS: 1 - OCCASIONAL, SLIGHT MOVEMENTS
INVOLUNTARY NASAL FLARING: 0 - NONE
INVOLUNTARY NASAL FLARING: 0 - NONE
RESTLESS NONPURPOSEFUL MOVEMENTS: 1 - OCCASIONAL, SLIGHT MOVEMENTS
LOOK OF FEAR: 0 - NONE
PARADOXICAL BREATHING PATTERN: 0 - NONE
LOOK OF FEAR: 0 - NONE
INVOLUNTARY NASAL FLARING: 0 - NONE
RESTLESS NONPURPOSEFUL MOVEMENTS: 2 - FREQUENT MOVEMENTS
LOOK OF FEAR: 0 - NONE
ACCESSORY MUSCLE RISE IN CLAVICLE DURING INSPIRATION: 0 - NONE
GRUNTING AT END OF EXPIRATION: 0 - NONE
RESPIRATORY RATE PER MINUTE: 1 - 19-30 BREATHS
PARADOXICAL BREATHING PATTERN: 0 - NONE
GRUNTING AT END OF EXPIRATION: 0 - NONE
LOOK OF FEAR: 0 - NONE
RESPIRATORY RATE PER MINUTE: 1 - 19-30 BREATHS
RDOS TOTAL SCORE: 4
ACCESSORY MUSCLE RISE IN CLAVICLE DURING INSPIRATION: 0 - NONE
PARADOXICAL BREATHING PATTERN: 0 - NONE
RESPIRATORY RATE PER MINUTE: 1 - 19-30 BREATHS
HEART RATE PER MINUTE: 1 - 90-109 BEATS
RESPIRATORY RATE PER MINUTE: 1 - 19-30 BREATHS
INVOLUNTARY NASAL FLARING: 0 - NONE
GRUNTING AT END OF EXPIRATION: 0 - NONE
INVOLUNTARY NASAL FLARING: 0 - NONE
ACCESSORY MUSCLE RISE IN CLAVICLE DURING INSPIRATION: 0 - NONE
PARADOXICAL BREATHING PATTERN: 0 - NONE
RESPIRATORY RATE PER MINUTE: 1 - 19-30 BREATHS
RDOS TOTAL SCORE: 4
INVOLUNTARY NASAL FLARING: 0 - NONE
ACCESSORY MUSCLE RISE IN CLAVICLE DURING INSPIRATION: 0 - NONE
RDOS TOTAL SCORE: 4
RDOS TOTAL SCORE: 4
GRUNTING AT END OF EXPIRATION: 0 - NONE
RDOS TOTAL SCORE: 4
RESTLESS NONPURPOSEFUL MOVEMENTS: 1 - OCCASIONAL, SLIGHT MOVEMENTS
LOOK OF FEAR: 0 - NONE
HEART RATE PER MINUTE: 2 - >109 BEATS
PARADOXICAL BREATHING PATTERN: 0 - NONE
GRUNTING AT END OF EXPIRATION: 0 - NONE
HEART RATE PER MINUTE: 1 - 90-109 BEATS
HEART RATE PER MINUTE: 2 - >109 BEATS
HEART RATE PER MINUTE: 1 - 90-109 BEATS

## 2024-06-11 ASSESSMENT — COGNITIVE AND FUNCTIONAL STATUS - GENERAL
DRESSING REGULAR UPPER BODY CLOTHING: TOTAL
DRESSING REGULAR LOWER BODY CLOTHING: TOTAL
WALKING IN HOSPITAL ROOM: TOTAL
TURNING FROM BACK TO SIDE WHILE IN FLAT BAD: TOTAL
TURNING FROM BACK TO SIDE WHILE IN FLAT BAD: TOTAL
MOVING TO AND FROM BED TO CHAIR: TOTAL
STANDING UP FROM CHAIR USING ARMS: TOTAL
PERSONAL GROOMING: TOTAL
DAILY ACTIVITIY SCORE: 6
CLIMB 3 TO 5 STEPS WITH RAILING: TOTAL
MOVING FROM LYING ON BACK TO SITTING ON SIDE OF FLAT BED WITH BEDRAILS: TOTAL
EATING MEALS: TOTAL
CLIMB 3 TO 5 STEPS WITH RAILING: TOTAL
MOBILITY SCORE: 6
MOVING FROM LYING ON BACK TO SITTING ON SIDE OF FLAT BED WITH BEDRAILS: TOTAL
MOBILITY SCORE: 6
HELP NEEDED FOR BATHING: TOTAL
MOVING TO AND FROM BED TO CHAIR: TOTAL
STANDING UP FROM CHAIR USING ARMS: TOTAL
TOILETING: TOTAL
WALKING IN HOSPITAL ROOM: TOTAL

## 2024-06-11 ASSESSMENT — PAIN - FUNCTIONAL ASSESSMENT
PAIN_FUNCTIONAL_ASSESSMENT: UNABLE TO SELF-REPORT
PAIN_FUNCTIONAL_ASSESSMENT: UNABLE TO SELF-REPORT

## 2024-06-11 NOTE — NURSING NOTE
RN Hospice Note    Carol Barajas is a Hospice Patient.   Hospice terminal diagnosis: Stroke  Physician: Acute Ischemic Stroke  Visit type: Admission - GIP    Comments/recommendations: Met with family to discuss options for patient.  Discussed hospice house as option for GIP initially to convert meds for restlessness and pain over to oral route.  Discussed if residential bed was offered due to patient unable to swallow, the R & B charges associated with it.  Education provided on decline.  Discussed goals of care.  All in agreement that HH is best option however no beds.  Converted patient to GIP here at , until bed becomes available for management of symptoms and if not, will need to come up with another discharge plan.  Hospice team to follow up tomorrow.    Discharge Planning:  Patient to be discharged to  hospice house    The following is to be completed:  Discharge order: Y  State DNR signed by MD: [facility] referral/transfer form: na  Medication reconciliation: Y  PAS/RR or convalescent stay form: na  Prescriptions for al narcotics/new medications: n  Transportation: Y  Other:      Plan of care reviewed with patient/family members Katya (dtr), Izzy (dtr) Anil (son) Roney (son) and Rosalie (daughter on phone)   Plan of care reviewed with hospital staff members: Monica Huitron MD, Neuro team Neda Valencia, RN     Please notify [facility] of any changes in condition. Thank you.  Office: [phone] (8 am-6:30 pm M-F and 8 am-4:30 pm weekends and holidays)   [phone] (6:30 pm-8 am M-F and 4:30 pm-8 am weekends and holidays)    Jammie Wayne RN

## 2024-06-11 NOTE — PROGRESS NOTES
Carol Barajas is a 79 y.o. female on day 3 of admission presenting with Acute ischemic left MCA stroke (Multi).    Subjective   GOC discussion yesterday - patient code status changes to ml with  consult for hospice     Objective     Last Recorded Vitals  Blood pressure 120/77, pulse 88, temperature 36.7 °C (98.1 °F), temperature source Temporal, resp. rate 18, weight 70.5 kg (155 lb 6.8 oz), SpO2 97%.    Physical Exam  GENERAL APPEARANCE:  No distress, Alert, able to maintain gaze  No resp distress or excessive secretions.   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 [place] [facility] [place] where NIHSS 19 (2-LOC question, 1-LOC command, 2-gaze, 2-visual, 2- facial, 3-RUE, 3-RLE, 2-dysarthria, 2-aphasia), rCTH with ASPECT score of 9 (L insula), CTA with L M1 occlusion. Revascularization achieved at 22:28 with TICI 2b. Post thrombectomy NIHSS 20 (gaze improved to 1 but LLE changed from 0 to 2 due to not able to follow command).   After discussion about expected prognosis and GOC. Family has made decision to transition to comfort care.     Plan:   -  Comfort care measures only  -PRNs ordered for secretions, resp distress, pain and agitation  - [place] for comfort care    Code status: DNR  NOK: Daughter Katya Baker ()     Kandace Chaparro MD  Neurology PGY-2

## 2024-06-11 NOTE — SIGNIFICANT EVENT
Advance Care Planning   Family meeting conducted with patients daughter Katya Baker, Niece and another male family member. Family expressed how patient was independent and had expressed that she would not want any artifical means of prolonging life.  Kandace Chaparro MD and medical student Lacey Ambrocio were part of the discussion.       We discussed patient's current medical status, reviewed imaging, and went over the patient's prognosis.  After significant discussion, the family relayed the patient's previously expressed wishes to not continue with life support measures. Therefore, patient's code status changed to Comfort Care. Comfort care focused ordered placed. SW consulted for hospice placement.      Kandace Chaparro MD  Neurology PGY-2

## 2024-06-12 VITALS
DIASTOLIC BLOOD PRESSURE: 92 MMHG | TEMPERATURE: 96.6 F | SYSTOLIC BLOOD PRESSURE: 177 MMHG | RESPIRATION RATE: 20 BRPM | HEART RATE: 82 BPM | OXYGEN SATURATION: 96 %

## 2024-06-12 PROCEDURE — 2500000004 HC RX 250 GENERAL PHARMACY W/ HCPCS (ALT 636 FOR OP/ED): Performed by: STUDENT IN AN ORGANIZED HEALTH CARE EDUCATION/TRAINING PROGRAM

## 2024-06-12 PROCEDURE — 99232 SBSQ HOSP IP/OBS MODERATE 35: CPT

## 2024-06-12 PROCEDURE — 2500000004 HC RX 250 GENERAL PHARMACY W/ HCPCS (ALT 636 FOR OP/ED)

## 2024-06-12 RX ORDER — HYOSCYAMINE SULFATE 0.12 MG/1
0.12 TABLET, ORALLY DISINTEGRATING ORAL EVERY 4 HOURS PRN
Status: DISCONTINUED | OUTPATIENT
Start: 2024-06-12 | End: 2024-06-13 | Stop reason: HOSPADM

## 2024-06-12 RX ORDER — FENTANYL CITRATE-0.9 % NACL/PF 1100MCG/55
PATIENT CONTROLLED ANALGESIA SYRINGE INJECTION CONTINUOUS
Status: DISCONTINUED | OUTPATIENT
Start: 2024-06-12 | End: 2024-06-13 | Stop reason: HOSPADM

## 2024-06-12 RX ORDER — LORAZEPAM 0.5 MG/1
0.5 TABLET ORAL EVERY 4 HOURS PRN
Status: DISCONTINUED | OUTPATIENT
Start: 2024-06-12 | End: 2024-06-13 | Stop reason: HOSPADM

## 2024-06-12 RX ORDER — ACETAMINOPHEN 160 MG/5ML
650 SOLUTION ORAL EVERY 4 HOURS PRN
Status: DISCONTINUED | OUTPATIENT
Start: 2024-06-12 | End: 2024-06-13 | Stop reason: HOSPADM

## 2024-06-12 RX ORDER — LORAZEPAM 0.5 MG/1
0.5 TABLET ORAL EVERY 4 HOURS PRN
Start: 2024-06-12

## 2024-06-12 RX ORDER — HYOSCYAMINE SULFATE 0.12 MG/1
0.12 TABLET, ORALLY DISINTEGRATING ORAL EVERY 4 HOURS PRN
Start: 2024-06-12

## 2024-06-12 RX ORDER — ACETAMINOPHEN 325 MG/1
650 TABLET ORAL EVERY 4 HOURS PRN
Status: DISCONTINUED | OUTPATIENT
Start: 2024-06-12 | End: 2024-06-13 | Stop reason: HOSPADM

## 2024-06-12 RX ORDER — HALOPERIDOL 5 MG/ML
1 INJECTION INTRAMUSCULAR EVERY 4 HOURS PRN
Status: DISCONTINUED | OUTPATIENT
Start: 2024-06-12 | End: 2024-06-13 | Stop reason: HOSPADM

## 2024-06-12 RX ORDER — MORPHINE SULFATE 20 MG/ML
5 SOLUTION ORAL
Start: 2024-06-12

## 2024-06-12 RX ORDER — ALBUTEROL SULFATE 0.83 MG/ML
2.5 SOLUTION RESPIRATORY (INHALATION) EVERY 4 HOURS PRN
Qty: 75 ML | Refills: 11 | OUTPATIENT
Start: 2024-06-12

## 2024-06-12 RX ORDER — MORPHINE SULFATE 20 MG/ML
5 SOLUTION ORAL
Status: DISCONTINUED | OUTPATIENT
Start: 2024-06-12 | End: 2024-06-13 | Stop reason: HOSPADM

## 2024-06-12 RX ORDER — GLYCOPYRROLATE 0.2 MG/ML
0.2 INJECTION INTRAMUSCULAR; INTRAVENOUS EVERY 4 HOURS PRN
Status: DISCONTINUED | OUTPATIENT
Start: 2024-06-12 | End: 2024-06-13 | Stop reason: HOSPADM

## 2024-06-12 ASSESSMENT — PAIN SCALES - PAIN ASSESSMENT IN ADVANCED DEMENTIA (PAINAD)
TOTALSCORE: MEDICATION (SEE MAR)
TOTALSCORE: 4
BODYLANGUAGE: TENSE, DISTRESSED PACING, FIDGETING
TOTALSCORE: MEDICATION (SEE MAR)
TOTALSCORE: 5
FACIALEXPRESSION: FACIAL GRIMACING
BREATHING: OCCASIONAL LABORED BREATHING, SHORT PERIOD OF HYPERVENTILATION
CONSOLABILITY: DISTRACTED OR REASSURED BY VOICE/TOUCH
FACIALEXPRESSION: FACIAL GRIMACING
BODYLANGUAGE: TENSE, DISTRESSED PACING, FIDGETING
BREATHING: OCCASIONAL LABORED BREATHING, SHORT PERIOD OF HYPERVENTILATION
CONSOLABILITY: NO NEED TO CONSOLE

## 2024-06-12 ASSESSMENT — RESPIRATORY DISTRESS OBSERVATION SCALE (RDOS)
INVOLUNTARY NASAL FLARING: 0 - NONE
GRUNTING AT END OF EXPIRATION: 0 - NONE
ACCESSORY MUSCLE RISE IN CLAVICLE DURING INSPIRATION: 0 - NONE
RDOS TOTAL SCORE: 4
INVOLUNTARY NASAL FLARING: 0 - NONE
INVOLUNTARY NASAL FLARING: 0 - NONE
LOOK OF FEAR: 0 - NONE
RESPIRATORY RATE PER MINUTE: 1 - 19-30 BREATHS
GRUNTING AT END OF EXPIRATION: 0 - NONE
LOOK OF FEAR: 0 - NONE
LOOK OF FEAR: 0 - NONE
PARADOXICAL BREATHING PATTERN: 0 - NONE
INVOLUNTARY NASAL FLARING: 0 - NONE
RESTLESS NONPURPOSEFUL MOVEMENTS: 2 - FREQUENT MOVEMENTS
RESPIRATORY RATE PER MINUTE: 1 - 19-30 BREATHS
RDOS TOTAL SCORE: 4
INVOLUNTARY NASAL FLARING: 0 - NONE
RESPIRATORY RATE PER MINUTE: 1 - 19-30 BREATHS
PARADOXICAL BREATHING PATTERN: 0 - NONE
GRUNTING AT END OF EXPIRATION: 0 - NONE
ACCESSORY MUSCLE RISE IN CLAVICLE DURING INSPIRATION: 0 - NONE
PARADOXICAL BREATHING PATTERN: 0 - NONE
RESTLESS NONPURPOSEFUL MOVEMENTS: 2 - FREQUENT MOVEMENTS
ACCESSORY MUSCLE RISE IN CLAVICLE DURING INSPIRATION: 0 - NONE
RESTLESS NONPURPOSEFUL MOVEMENTS: 2 - FREQUENT MOVEMENTS
LOOK OF FEAR: 0 - NONE
HEART RATE PER MINUTE: 1 - 90-109 BEATS
HEART RATE PER MINUTE: 1 - 90-109 BEATS
PARADOXICAL BREATHING PATTERN: 0 - NONE
HEART RATE PER MINUTE: 1 - 90-109 BEATS
RDOS TOTAL SCORE: 4
RDOS TOTAL SCORE: 4
ACCESSORY MUSCLE RISE IN CLAVICLE DURING INSPIRATION: 0 - NONE
RDOS TOTAL SCORE: 4
LOOK OF FEAR: 0 - NONE
HEART RATE PER MINUTE: 2 - >109 BEATS
GRUNTING AT END OF EXPIRATION: 0 - NONE
RESTLESS NONPURPOSEFUL MOVEMENTS: 2 - FREQUENT MOVEMENTS
ACCESSORY MUSCLE RISE IN CLAVICLE DURING INSPIRATION: 0 - NONE
RESPIRATORY RATE PER MINUTE: 1 - 19-30 BREATHS
PARADOXICAL BREATHING PATTERN: 0 - NONE
RESTLESS NONPURPOSEFUL MOVEMENTS: 1 - OCCASIONAL, SLIGHT MOVEMENTS
GRUNTING AT END OF EXPIRATION: 0 - NONE
RESPIRATORY RATE PER MINUTE: 1 - 19-30 BREATHS
HEART RATE PER MINUTE: 1 - 90-109 BEATS

## 2024-06-12 NOTE — DISCHARGE SUMMARY
Discharge Diagnosis  Acute ischemic left MCA stroke (Multi)    Problem List  Principal Problem:    Acute ischemic left MCA stroke (Multi)      Carol Barajas is a 79 y.o. female who presented to the hospital with Right sided weakness. They were diagnosed with a stroke.  Etiology: Ischemic Stroke: Cardioembolism    Relevant hospital complications: Dysphagia    No MRI head results found for the past 14 days  CT head wo IV contrast    Result Date: 6/10/2024  Interpreted By:  Kael Henry, STUDY: CT HEAD WO IV CONTRAST; ;  6/9/2024 8:52 pm   INDICATION: Signs/Symptoms:stability scan post thrombectomy.   COMPARISON: CT head from 06/09/2024.   ACCESSION NUMBER(S): [id number]   ORDERING CLINICIAN: JARVIS KRUGER   TECHNIQUE: Routine unenhanced CT of the head was performed.   FINDINGS: There is hypoattenuation located within the left frontal lobe consistent with evolving infarct. There is no striking hyperattenuation within this region to suggest hemorrhagic transformation. There is mild surrounding mass effect including sulcal effacement.   Hyperattenuation located within the left cerebral hemisphere on the prior CT head has significantly decreased and likely represents contrast staining. There is slight remaining hyperattenuation within the left basal ganglia which may reflect contrast staining versus small amount of blood products, attention on follow-up imaging. There is no surrounding mass effect.   Effacement of sulci within the left cerebral hemisphere has significantly decreased.   There is no midline shift. Remaining intracranial findings including findings of probable chronic small vessel ischemic changes are unchanged since prior.   There are tiny mucosal cysts located within the left maxillary sinus, otherwise the visualized paranasal sinuses and mastoid air cells are essentially clear.       1. Evolving left MCA territory infarct with pronounced hypoattenuation located within the left frontal lobe consistent with  evolving infarction. There is mild surrounding mass effect and no striking hemorrhagic transformation   2. Decrease in effacement of sulci within the left cerebral hemisphere.   3. Marked decrease in hyperattenuation throughout the left cerebral hemisphere which likely represents contrast staining. Some hyperattenuation remains within the left basal ganglia likely representing contrast staining versus blood products. No striking surrounding mass effect.   This study was interpreted at [facility].   MACRO: None   Signed by: Kael Henry 6/10/2024 8:55 AM Dictation workstation:   [id number]    CT head wo IV contrast    Result Date: 6/9/2024  Interpreted By:  Kael Henry, STUDY: CT HEAD WO IV CONTRAST; ;  6/9/2024 6:47 am   INDICATION: Signs/Symptoms:Post thrombectomy.   COMPARISON: CT head from 06/08/2024.   ACCESSION NUMBER(S): [id number]   ORDERING CLINICIAN: JARVIS KRUGER   TECHNIQUE: Routine unenhanced CT of the head was performed.   FINDINGS: There is new hyperattenuation in multiple regions involving the left cerebral hemisphere including within the frontal, parietal, and temporal lobes as well as the basal ganglia which may reflect contrast staining versus blood products. There is new surrounding sulcal effacement related to evolving infarct. There is no striking midline shift. Basilar cisterns and ventricles are essentially unchanged in size.   No acute transcortical infarct or hemorrhage is seen within the right cerebral hemisphere.   The calvarium is unremarkable in appearance. Small mucosal cyst within the left maxillary sinus.       Evolving left MCA territory infarct with new regions of hyperattenuation which may reflect contrast staining versus blood products. There is new surrounding mass effect including sulcal effacement. There is no midline shift.   This study was interpreted at [facility].   MACRO: None   Signed by: Kael Henry  6/9/2024 1:08 PM Dictation workstation:   [id number]   Transthoracic Echo (TTE) Complete    Result Date: 6/10/2024   [facility], [address] [place] [address]                Tel [phone] and Fax [phone] TRANSTHORACIC ECHOCARDIOGRAM REPORT  Patient Name:      MICHELLE SCHULTZ          Reading Physician:    [id number] Adrien Lassiter MD Study Date:        6/10/2024            Ordering Provider:    [id number] JARVIS KRUGER MRN/PID:           [id number]             Fellow: Accession#:        [id number]         Nurse: Date of Birth/Age: 1945 / 79 years Sonographer:          Xena Temple RDCS Gender:            F                    Additional Staff: Height:            165.10 cm            Admit Date:           6/8/2024 Weight:            70.31 kg             Admission Status:     Inpatient -                                                               Routine BSA / BMI:         1.78 m2 / 25.79      Encounter#:           [id number]                    kg/m2                                         Department Location:  [facility] Blood Pressure: 132 /88 mmHg Study Type:    TRANSTHORACIC ECHO (TTE) COMPLETE Diagnosis/ICD: Cerebral infarction due to unspecified occlusion or stenosis of                left middle cerebral artery-I63.512 Indication:    Stroke CPT Code:      Echo Complete w Full Doppler-[id number] Patient History: Pertinent History: CAD, Afib, VVIR Pacemaker 8/25/2023, DM2, HTN, Syncope,                    Stroke. Study Detail: The following Echo studies were performed: 2D, M-Mode, Doppler and               color flow. Technically challenging study due to body habitus,               patient lying in supine position, prominent lung artifact and poor               acoustic windows. Definity used as a contrast agent for               endocardial border definition and agitated saline used as a               contrast agent for intraseptal  flow evaluation. Total contrast               used for this procedure was 2.0 mL via IV push.  PHYSICIAN INTERPRETATION: Left Ventricle: The left ventricular systolic function is normal, with an estimated ejection fraction of 60-65%. The patient is in atrial fibrillation which may influence the estimate of left ventricular function and transvalvular flows. There are no regional wall motion abnormalities. The left ventricular cavity size is normal. Left ventricular diastolic filling was indeterminate. There is anterobasal septal hypertrophy with a sigmoid septum and systolic anterior motion without septal-mitral contact. Left Atrium: The left atrium is moderately dilated. A bubble study using agitated saline was very technically difficult and unable to be interpreted. Right Ventricle: The right ventricle was not well visualized. Unable to determine right ventricular systolic function. Right Atrium: The right atrium is mildly dilated. Aortic Valve: The aortic valve is trileaflet. There is minimal aortic valve cusp calcification. There is no evidence of aortic valve regurgitation. The peak instantaneous gradient of the aortic valve is 6.1 mmHg. Mitral Valve: The mitral valve is mildly thickened. There is moderate mitral annular calcification. There is no evidence of mitral valve regurgitation. Tricuspid Valve: The tricuspid valve is structurally normal. There is mild to moderate tricuspid regurgitation. The Doppler estimated RVSP is mild to moderately elevated at 45.0 mmHg. Pulmonic Valve: The pulmonic valve is structurally normal. There is physiologic pulmonic valve regurgitation. Pericardium: There is no pericardial effusion noted. Aorta: The aortic root is normal. Systemic Veins: The inferior vena cava appears to be of normal size. There is IVC inspiratory collapse greater than 50%. In comparison to the previous echocardiogram(s): There are no prior studies on this patient for comparison purposes.  CONCLUSIONS:  1.  Left ventricular systolic function is normal with a 60-65% estimated ejection fraction.  2. Poorly visualized anatomical structures due to suboptimal image quality.  3. There is anterobasal septal hypertrophy with a sigmoid septum and systolic anterior motion without septal-mitral contact.  4. The left atrium is moderately dilated.  5. There is moderate mitral annular calcification.  6. Mild to moderate tricuspid regurgitation visualized.  7. Mild to moderately elevated right ventricular systolic pressure.  8. The patient is in atrial fibrillation which may influence the estimate of left ventricular function and transvalvular flows. QUANTITATIVE DATA SUMMARY: 2D MEASUREMENTS:                    Normal Ranges: Ao Root d: 3.40 cm (2.0-3.7cm) LA VOLUME:                              Normal Ranges: LA Vol A4C:        77.4 ml   (22+/-6mL/m2) LA Vol Index A4C:  43.6ml/m2 LA Area A4C:       24.7 cm2 LA Major Axis A4C: 6.7 cm AORTIC VALVE:                         Normal Ranges: AoV Vmax:      1.23 m/s (<=1.7m/s) AoV Peak P.1 mmHg (<20mmHg) LVOT Max Villa:  0.97 m/s (<=1.1m/s) LVOT VTI:      18.60 cm LVOT Diameter: 2.00 cm  (1.8-2.4cm) AoV Area,Vmax: 2.49 cm2 (2.5-4.5cm2) TRICUSPID VALVE/RVSP:                             Normal Ranges: Peak TR Velocity: 3.24 m/s RV Syst Pressure: 45.0 mmHg (< 30mmHg) IVC Diam:         1.80 cm PULMONIC VALVE:                      Normal Ranges: PV Max Villa: 0.8 m/s  (0.6-0.9m/s) PV Max P.9 mmHg  [id number] Adrien Lassiter MD Electronically signed on 6/10/2024 at 10:45:15 AM  ** Final **        Results from last 7 days   Lab Units 24  0010   HEMOGLOBIN A1C % 8.2*     BNP   Date/Time Value Ref Range Status   2024 12:10  (H) 0 - 99 pg/mL Final       Test Results Pending At Discharge  Pending Labs       No current pending labs.            Hospital Course  79 y.o. RH female w/ PMHx of CAD, Afib (on Warfarin, subtherapeutic INR), VVIR pacemaker (placed on 2023, DM2 (diagnosed  8/2023 with A1c 7.6), C2-C6 cervical disc disease (significant b/l foraminal canal stenosis), [place] [facility] [place], LKW 19:00 pm, s/p TNK @ 20:17 s/p thrombectomy 22:30 6/8. Admitted to [facility] for post-MT care. rCTH performed, negative for SAH. Downgraded to LING 06/10/24 with neuro checks every 2 hours. TTE 6/10 shows Afib, normal LV function, and no evidence of LV thrombus. Limited improvement in physical exam and NIHSS.  We discussed patient's current medical status, reviewed imaging, and went over the patient's prognosis.  After significant discussion, the family relayed the patient's previously expressed wishes to not continue with life support measures. Therefore, patient's status changed to Comfort Care.  Patient was discharged to hospice.    Pertinent Physical Exam At Time of Discharge  Physical Exam  Neurological Exam  No distress, Alert, able to maintain gaze  No resp distress or excessive secretions.   Abdomen nd nt    Outpatient Follow-Up  No future appointments.    Kandace Chaparro MD

## 2024-06-12 NOTE — ED PROVIDER NOTES
HPI:  Carol Barajas is a 79 y.o. with a history of DM, A-fib on warfarin, presenting to the emergency department with concern for large vessel occlusion.  Patient was seen at an outside hospital, given TNK, noted to have aphasia, right-sided paralysis and R facial droop sent for concern for left MCA occlusion and possible thrombectomy candidate.  Of note TNK was given when patient's INR was noted to be low.  Last known well 7 PM.  TNK given at 817.    Limitations to History: Aphasia    ------------------------------------------------------------------------------------------------------------------------------------------    Physical Exam:    ED Triage Vitals   Temp Heart Rate Resp BP   06/08/24 2115 06/08/24 2115 06/08/24 2115 06/08/24 2130   36 °C (96.8 °F) 95 18 (!) 173/112      SpO2 Temp Source Heart Rate Source Patient Position   06/08/24 2115 06/08/24 2115 06/10/24 0800 06/10/24 0800   98 % Temporal Monitor Lying      BP Location FiO2 (%)     06/10/24 0800 06/08/24 2155     Left arm 33 %         Gen: Alert, awake.  Vital signs on arrival show elevated blood pressure, remaining are stable.  Head/Neck: NCAT, neck w/ FROM  Eyes: Pupils equal round and reactive to light   nose: Nares patent w/o rhinorrhea  Mouth:  MMM, no OP lesions noted  Heart: Irregularly irregular, well perfused.  Distals is intact.  Lungs: CTA b/l no RRW, no increased work of breathing  Abdomen: soft, NT, ND, no rebound guarding or rigidity  Extremities: Warm, well perfused. Compartments soft, nontender  Neurologic: Awake, alert.  Nonverbal concerning for aphasia, does not blink to threat on the right with some hemineglect on the right.  Right-sided facial droop.  Following commands on the left upper extremity and left lower extremity.  Right upper and right lower extremity with no movement against gravity.  Unable to assess LOC questions, NIHSS 19, Van positive.  Skin: warm, dry   Psychological: calm, cooperative      ------------------------------------------------------------------------------------------------------------------------------------------    Medical Decision Making  79-year-old female with a past medical history of A-fib on warfarin, DM, presenting to the emergency department with concern for left M1 occlusion, transferred from outside hospital, last known well 7 PM, given TNK after INR found to be low, transferred for thrombectomy evaluation.  Found to be Van positive.  On arrival, stroke alert was called, neurology obtained CT head, CTA and take patient to thrombectomy plan for NSU.      Diagnoses as of 06/12/24 1254   Acute ischemic left MCA stroke (Multi)        Procedures      Discussion of Management with Other Providers:  Neurostroke      Clinical Impression: L MCA occlusion     Dispo: ADM     Discussed with ED Attending, Dr. Espinoza       This note was dictated with Voice Recognition software, please excuse any dictation errors.     Debra Pickett DO   Emergency Medicine, PGY3      Debra Pickett DO  Resident  06/12/24 1300

## 2024-06-12 NOTE — HOSPITAL COURSE
79 y.o. RH female w/ PMHx of CAD, Afib (on Warfarin, subtherapeutic INR), VVIR pacemaker (placed on 8/25/2023, DM2 (diagnosed 8/2023 with A1c 7.6), C2-C6 cervical disc disease (significant b/l foraminal canal stenosis), presenting as a transfer from [facility] [place] with L MCA stroke syndrome, LKW 19:00 pm, s/p TNK @ 20:17 s/p thrombectomy 22:30 6/8. Admitted to NSU for post-MT care. rCTH performed, negative for SAH. Downgraded to LING 06/10/24 with neuro checks every 2 hours. TTE 6/10 shows Afib, normal LV function, and no evidence of LV thrombus. Limited improvement in physical exam and NIHSS.  We discussed patient's current medical status, reviewed imaging, and went over the patient's prognosis.  After significant discussion, the family relayed the patient's previously expressed wishes to not continue with life support measures. Therefore, patient's status changed to Comfort Care.  Patient was discharged to hospice.     Pertinent Physical Exam At Time of Discharge  Physical Exam  Neurological Exam  GENERAL APPEARANCE:  No distress, Alert, able to maintain gaze  No resp distress or excessive secretions.   Abdomen nd nt

## 2024-06-12 NOTE — PROGRESS NOTES
Carol Barajas is a 79 y.o. female on day 1 of admission presenting with No Principal Problem: There is no principal problem currently on the Problem List. Please update the Problem List and refresh..    Subjective   Pateints admitted under hospice. Stroke team will still be primary until bed available at hospice     Objective     Last Recorded Vitals  Blood pressure (!) 177/92, pulse 82, temperature 36.3 °C (97.3 °F), resp. rate 20, SpO2 96%.    Physical Exam  GENERAL APPEARANCE:  No distress, Alert, able to maintain gaze  No resp distress or excessive secretions.   Abdomen nd nt       PRN medications  PRN medications: acetaminophen, albuterol, hyoscyamine, LORazepam, morphine, polyethylene glycol    No results found for this or any previous visit (from the past 24 hour(s)).       Assessment/Plan   Active Problems:  There are no active Hospital Problems.    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 Transfer[place] [facility] [place] where NIHSS 19 (2-LOC question, 1-LOC command, 2-gaze, 2-visual, 2- facial, 3-RUE, 3-RLE, 2-dysarthria, 2-aphasia), [facility] with ASPECT score of 9 (L insula), CTA with L M1 occlusion. Revascularization achieved at 22:28 with TICI 2b. Post thrombectomy NIHSS 20 (gaze improved to 1 but LLE changed from 0 to 2 due to not able to follow command).   After discussion about expected prognosis and GOC. Family has made decision to transition to comfort care.     Plan:   - Comfort care measures only  -PRNs ordered for secretions, resp distress, pain and agitation - IV changed to oral meds today per hospice nurse recommendation  - [place] for comfort care    Code status: DNR  NOK: Daughter Katya Baker [phone])     Kandace Chaparro MD  Neurology PGY-2

## 2024-06-13 NOTE — NURSING NOTE
Patient picked up by transport. Will be transferred to the hospice facility. 2 IV line still intact and flushing, not pulled out as requested by the family and hospice nurse. [place] Catheter also is still in. Family is thankful with the staff. No complaints or any issue.

## 2024-06-15 NOTE — H&P
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 Transferred to [facility] [place] where NIHSS 19 (2-LOC question, 1-LOC command, 2-gaze, 2-visual, 2- facial, 3-RUE, 3-RLE, 2-dysarthria, 2-aphasia), rCTH with ASPECT score of 9 (L insula), CTA with L M1 occlusion. Revascularization achieved at 22:28 with TICI 2b. Post thrombectomy NIHSS 20 (gaze improved to 1 but LLE changed from 0 to 2 due to not able to follow command).   After discussion about expected prognosis and GOC. Family has made decision to transition to comfort care. Patient is currently admitted under hospice.     Past Medical History  CAD, Afib (on Warfarin), VVIR pacemaker (placed on 8/25/2023, DM2 (diagnosed 8/2023 with A1c 7.6), C2-C6 cervical disc disease   Surgical History  No past surgical history on file.  Social History  Social History     Tobacco Use    Smoking status: Never    Smokeless tobacco: Never   Substance Use Topics    Alcohol use: Not Currently    Drug use: Never     Allergies  Penicillins and Codeine  Home Medications  No medications prior to admission.       Review of Systems  Neurological Exam  Physical Exam  Last Recorded Vitals  Blood pressure (!) 177/92, pulse 82, temperature 35.9 °C (96.6 °F), temperature source Temporal, resp. rate 20, SpO2 96%.        Relevant Results  PRN medications: acetaminophen, albuterol, glycopyrrolate, haloperidol lactate, haloperidol lactate, hyoscyamine, polyethylene glycol        GENERAL APPEARANCE:  No distress, Alert  CARDIOVASCULAR: Afib on monitor. Pulses +2 and equal in all extremities.  MENTAL STATE:  Patient remains aphasic. Somnolent but responsive to verbal stimulation, able to  follow commands to  with L hand and blink.   CRANIAL NERVES:  Cranial nerves were normal.      CN 2- No blink to threat on R (R HH).      CN 3, 4, 6-  Pupils round, 4 mm in diameter, equally reactive to light. No ptosis, R gaze palsy, able to cross midline today.     CN 5- Unable to assess due to AMS     CN 7- Unable to assess due to AMS     CN 8- Unable to assess due to AMS      CN 9- Unable to assess due to AMS      CN 11- Unable to assess due to AMS      CN 12- Unable to assess due to AMS   MOTOR:    RUE flicker withdrawal to pain  RLE flicker withdrawal to pain  LUE/LLE spontaneously AG  No fasciculations, tremor or other abnormal movements were present  REFLEXES:  Left: Biceps 2/2, brachioradialis 2/2, triceps 2/2, patellar 1/2, ankle 1/2 - Babinski: toes downgoing to plantar stimulation. No clonus.  Right: Biceps 1/2, brachioradialis 2/2, triceps 1/2, patellar 0/2, ankle 1/2 - Babinski: toes up going to plantar stimulation.  SENSORY: Localizes with nox stim in all extremities  COORDINATION: Unable to assess due to AMS    GAIT: Deferred for patient's safety               [place] Coma Scale  Best Eye Response: To verbal stimuli  Best Verbal Response: None  Best Motor Response: Follows commands  [place] Coma Scale Score: 10                 No MRI head results found for the past 14 days  CT head wo IV contrast    Result Date: 6/10/2024  Interpreted By:  Kael Henry, STUDY: CT HEAD WO IV CONTRAST; ;  6/9/2024 8:52 pm   INDICATION: Signs/Symptoms:stability scan post thrombectomy.   COMPARISON: CT head from 06/09/2024.   ACCESSION NUMBER(S): [id number]   ORDERING CLINICIAN: JARVIS KRUGER   TECHNIQUE: Routine unenhanced CT of the head was performed.   FINDINGS: There is hypoattenuation located within the left frontal lobe consistent with evolving infarct. There is no striking hyperattenuation within this region to suggest hemorrhagic transformation. There is mild surrounding mass effect including sulcal  effacement.   Hyperattenuation located within the left cerebral hemisphere on the prior CT head has significantly decreased and likely represents contrast staining. There is slight remaining hyperattenuation within the left basal ganglia which may reflect contrast staining versus small amount of blood products, attention on follow-up imaging. There is no surrounding mass effect.   Effacement of sulci within the left cerebral hemisphere has significantly decreased.   There is no midline shift. Remaining intracranial findings including findings of probable chronic small vessel ischemic changes are unchanged since prior.   There are tiny mucosal cysts located within the left maxillary sinus, otherwise the visualized paranasal sinuses and mastoid air cells are essentially clear.       1. Evolving left MCA territory infarct with pronounced hypoattenuation located within the left frontal lobe consistent with evolving infarction. There is mild surrounding mass effect and no striking hemorrhagic transformation   2. Decrease in effacement of sulci within the left cerebral hemisphere.   3. Marked decrease in hyperattenuation throughout the left cerebral hemisphere which likely represents contrast staining. Some hyperattenuation remains within the left basal ganglia likely representing contrast staining versus blood products. No striking surrounding mass effect.   This study was interpreted at [facility].   MACRO: None   Signed by: Kael Henry 6/10/2024 8:55 AM Dictation workstation:   [id number]    CT head wo IV contrast    Result Date: 6/9/2024  Interpreted By:  Kael Henry, STUDY: CT HEAD WO IV CONTRAST; ;  6/9/2024 6:47 am   INDICATION: Signs/Symptoms:Post thrombectomy.   COMPARISON: CT head from 06/08/2024.   ACCESSION NUMBER(S): [id number]   ORDERING CLINICIAN: JARVIS KRUGER   TECHNIQUE: Routine unenhanced CT of the head was performed.   FINDINGS: There is new hyperattenuation in  multiple regions involving the left cerebral hemisphere including within the frontal, parietal, and temporal lobes as well as the basal ganglia which may reflect contrast staining versus blood products. There is new surrounding sulcal effacement related to evolving infarct. There is no striking midline shift. Basilar cisterns and ventricles are essentially unchanged in size.   No acute transcortical infarct or hemorrhage is seen within the right cerebral hemisphere.   The calvarium is unremarkable in appearance. Small mucosal cyst within the left maxillary sinus.       Evolving left MCA territory infarct with new regions of hyperattenuation which may reflect contrast staining versus blood products. There is new surrounding mass effect including sulcal effacement. There is no midline shift.   This study was interpreted at [facility].   MACRO: None   Signed by: Kael Henry 6/9/2024 1:08 PM Dictation workstation:   [id number]   Transthoracic Echo (TTE) Complete    Result Date: 6/10/2024   [facility], [address] [place] [address]                Tel [phone] and Fax [phone] TRANSTHORACIC ECHOCARDIOGRAM REPORT  Patient Name:      MICHELLE Lu Physician:    [id number] Adrien Lassiter MD Study Date:        6/10/2024            Ordering Provider:    [id number] JARVIS KRUGER MRN/PID:           [id number]             Fellow: Accession#:        [id number]         Nurse: Date of Birth/Age: 1945 / 79 years Sonographer:          Xena Temple RDCS Gender:            F                    Additional Staff: Height:            165.10 cm            Admit Date:           6/8/2024 Weight:            70.31 kg             Admission Status:     Inpatient -                                                               Routine BSA / BMI:         1.78 m2 / 25.79      Encounter#:           [id number]                     kg/m2                                         Department Location:  [facility] Blood Pressure: 132 /88 mmHg Study Type:    TRANSTHORACIC ECHO (TTE) COMPLETE Diagnosis/ICD: Cerebral infarction due to unspecified occlusion or stenosis of                left middle cerebral artery-I63.512 Indication:    Stroke CPT Code:      Echo Complete w Full Doppler-[id number] Patient History: Pertinent History: CAD, Afib, VVIR Pacemaker 8/25/2023, DM2, HTN, Syncope,                    Stroke. Study Detail: The following Echo studies were performed: 2D, M-Mode, Doppler and               color flow. Technically challenging study due to body habitus,               patient lying in supine position, prominent lung artifact and poor               acoustic windows. Definity used as a contrast agent for               endocardial border definition and agitated saline used as a               contrast agent for intraseptal flow evaluation. Total contrast               used for this procedure was 2.0 mL via IV push.  PHYSICIAN INTERPRETATION: Left Ventricle: The left ventricular systolic function is normal, with an estimated ejection fraction of 60-65%. The patient is in atrial fibrillation which may influence the estimate of left ventricular function and transvalvular flows. There are no regional wall motion abnormalities. The left ventricular cavity size is normal. Left ventricular diastolic filling was indeterminate. There is anterobasal septal hypertrophy with a sigmoid septum and systolic anterior motion without septal-mitral contact. Left Atrium: The left atrium is moderately dilated. A bubble study using agitated saline was very technically difficult and unable to be interpreted. Right Ventricle: The right ventricle was not well visualized. Unable to determine right ventricular systolic function. Right Atrium: The right atrium is mildly dilated. Aortic Valve: The aortic valve is trileaflet. There is minimal aortic valve cusp  calcification. There is no evidence of aortic valve regurgitation. The peak instantaneous gradient of the aortic valve is 6.1 mmHg. Mitral Valve: The mitral valve is mildly thickened. There is moderate mitral annular calcification. There is no evidence of mitral valve regurgitation. Tricuspid Valve: The tricuspid valve is structurally normal. There is mild to moderate tricuspid regurgitation. The Doppler estimated RVSP is mild to moderately elevated at 45.0 mmHg. Pulmonic Valve: The pulmonic valve is structurally normal. There is physiologic pulmonic valve regurgitation. Pericardium: There is no pericardial effusion noted. Aorta: The aortic root is normal. Systemic Veins: The inferior vena cava appears to be of normal size. There is IVC inspiratory collapse greater than 50%. In comparison to the previous echocardiogram(s): There are no prior studies on this patient for comparison purposes.  CONCLUSIONS:  1. Left ventricular systolic function is normal with a 60-65% estimated ejection fraction.  2. Poorly visualized anatomical structures due to suboptimal image quality.  3. There is anterobasal septal hypertrophy with a sigmoid septum and systolic anterior motion without septal-mitral contact.  4. The left atrium is moderately dilated.  5. There is moderate mitral annular calcification.  6. Mild to moderate tricuspid regurgitation visualized.  7. Mild to moderately elevated right ventricular systolic pressure.  8. The patient is in atrial fibrillation which may influence the estimate of left ventricular function and transvalvular flows. QUANTITATIVE DATA SUMMARY: 2D MEASUREMENTS:                    Normal Ranges: Ao Root d: 3.40 cm (2.0-3.7cm) LA VOLUME:                              Normal Ranges: LA Vol A4C:        77.4 ml   (22+/-6mL/m2) LA Vol Index A4C:  43.6ml/m2 LA Area A4C:       24.7 cm2 LA Major Axis A4C: 6.7 cm AORTIC VALVE:                         Normal Ranges: AoV Vmax:      1.23 m/s (<=1.7m/s) AoV Peak  P.1 mmHg (<20mmHg) LVOT Max [place]:  0.97 m/s (<=1.1m/s) LVOT VTI:      18.60 cm LVOT Diameter: 2.00 cm  (1.8-2.4cm) AoV Area,Vmax: 2.49 cm2 (2.5-4.5cm2) TRICUSPID VALVE/RVSP:                             Normal Ranges: Peak TR Velocity: 3.24 m/s RV Syst Pressure: 45.0 mmHg (< 30mmHg) IVC Diam:         1.80 cm PULMONIC VALVE:                      Normal Ranges: PV Max Villa: 0.8 m/s  (0.6-0.9m/s) PV Max P.9 mmHg  [id number] Adrien Lassiter MD Electronically signed on 6/10/2024 at 10:45:15 AM  ** Final **        Results from last 7 days   Lab Units 24  0010   HEMOGLOBIN A1C % 8.2*     BNP   Date/Time Value Ref Range Status   2024 12:10  (H) 0 - 99 pg/mL Final     Assessment/Plan   Principal Problem:    Acute ischemic left MCA stroke (Multi)     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 [place] [facility] [place] (2-LOC question, 1-LOC command, 2-gaze, 2-visual, 2- facial, 3-RUE, 3-RLE, 2-dysarthria, 2-aphasia), [facility] 9 (L insula), CTA with L M1 occlusion. Revascularization achieved at 22:28 with TICI 2b. Post thrombectomy NIHSS 20 (gaze improved to 1 but LLE changed from 0 to 2 due to not able to follow command).   After discussion about expected prognosis and GOC. Family has made decision to transition to comfort care. Patient currently admitted under hospice and being seen by hospice nurse daily.      Plan:   - Comfort care measures only  -PRNs ordered for secretions, resp distress, pain and agitation  - [place] for comfort care     Code status: DNR  NOK: Daughter Katya Baker ()     Kandace Chaparro MD  Neurology PGY-2    Kandace Chaparro MD

## 2024-06-15 NOTE — DISCHARGE SUMMARY
Discharge Diagnosis  Acute ischemic left MCA stroke (Multi)    Problem List  Principal Problem:    Acute ischemic left MCA stroke (Multi)      Carol Barajas is a 79 y.o. female who presented to the hospital with right sided weakness and aphasia. They were diagnosed with a stroke.  Etiology: Ischemic Stroke: Cardioembolism    Relevant hospital complications: None  Discharge antithrombotics: None  Pending evaluation:  None   Issues Requiring Follow-Up  None  No MRI head results found for the past 14 days  CT head wo IV contrast    Result Date: 6/10/2024  Interpreted By:  Kael Henry, STUDY: CT HEAD WO IV CONTRAST; ;  6/9/2024 8:52 pm   INDICATION: Signs/Symptoms:stability scan post thrombectomy.   COMPARISON: CT head from 06/09/2024.   ACCESSION NUMBER(S): [id number]   ORDERING CLINICIAN: JARVIS KRUGER   TECHNIQUE: Routine unenhanced CT of the head was performed.   FINDINGS: There is hypoattenuation located within the left frontal lobe consistent with evolving infarct. There is no striking hyperattenuation within this region to suggest hemorrhagic transformation. There is mild surrounding mass effect including sulcal effacement.   Hyperattenuation located within the left cerebral hemisphere on the prior CT head has significantly decreased and likely represents contrast staining. There is slight remaining hyperattenuation within the left basal ganglia which may reflect contrast staining versus small amount of blood products, attention on follow-up imaging. There is no surrounding mass effect.   Effacement of sulci within the left cerebral hemisphere has significantly decreased.   There is no midline shift. Remaining intracranial findings including findings of probable chronic small vessel ischemic changes are unchanged since prior.   There are tiny mucosal cysts located within the left maxillary sinus, otherwise the visualized paranasal sinuses and mastoid air cells are essentially clear.       1. Evolving left MCA  territory infarct with pronounced hypoattenuation located within the left frontal lobe consistent with evolving infarction. There is mild surrounding mass effect and no striking hemorrhagic transformation   2. Decrease in effacement of sulci within the left cerebral hemisphere.   3. Marked decrease in hyperattenuation throughout the left cerebral hemisphere which likely represents contrast staining. Some hyperattenuation remains within the left basal ganglia likely representing contrast staining versus blood products. No striking surrounding mass effect.   This study was interpreted at [facility].   MACRO: None   Signed by: Kael Henry 6/10/2024 8:55 AM Dictation workstation:   [id number]    CT head wo IV contrast    Result Date: 6/9/2024  Interpreted By:  Kael Henry, STUDY: CT HEAD WO IV CONTRAST; ;  6/9/2024 6:47 am   INDICATION: Signs/Symptoms:Post thrombectomy.   COMPARISON: CT head from 06/08/2024.   ACCESSION NUMBER(S): [id number]   ORDERING CLINICIAN: JARVIS KRUGER   TECHNIQUE: Routine unenhanced CT of the head was performed.   FINDINGS: There is new hyperattenuation in multiple regions involving the left cerebral hemisphere including within the frontal, parietal, and temporal lobes as well as the basal ganglia which may reflect contrast staining versus blood products. There is new surrounding sulcal effacement related to evolving infarct. There is no striking midline shift. Basilar cisterns and ventricles are essentially unchanged in size.   No acute transcortical infarct or hemorrhage is seen within the right cerebral hemisphere.   The calvarium is unremarkable in appearance. Small mucosal cyst within the left maxillary sinus.       Evolving left MCA territory infarct with new regions of hyperattenuation which may reflect contrast staining versus blood products. There is new surrounding mass effect including sulcal effacement. There is no midline shift.   This study  was interpreted at [facility].   MACRO: None   Signed by: Kael Henry 6/9/2024 1:08 PM Dictation workstation:   [id number]   Transthoracic Echo (TTE) Complete    Result Date: 6/10/2024   [facility], [address] [place] [address]                Tel [phone] and Fax [phone] TRANSTHORACIC ECHOCARDIOGRAM REPORT  Patient Name:      MICHELLE SCHULTZ          Reading Physician:    [id number] Adrien Lassiter MD Study Date:        6/10/2024            Ordering Provider:    [id number] JARVIS SLICK MRN/PID:           [id number]             Fellow: Accession#:        [id number]         Nurse: Date of Birth/Age: 1945 / 79 years Sonographer:          Xena Temple RDCS Gender:            F                    Additional Staff: Height:            165.10 cm            Admit Date:           6/8/2024 Weight:            70.31 kg             Admission Status:     Inpatient -                                                               Routine BSA / BMI:         1.78 m2 / 25.79      Encounter#:           [id number]                    kg/m2                                         Department Location:  [facility] Blood Pressure: 132 /88 mmHg Study Type:    TRANSTHORACIC ECHO (TTE) COMPLETE Diagnosis/ICD: Cerebral infarction due to unspecified occlusion or stenosis of                left middle cerebral artery-I63.512 Indication:    Stroke CPT Code:      Echo Complete w Full [id number] Patient History: Pertinent History: CAD, Afib, VVIR Pacemaker 8/25/2023, DM2, HTN, Syncope,                    Stroke. Study Detail: The following Echo studies were performed: 2D, M-Mode, Doppler and               color flow. Technically challenging study due to body habitus,               patient lying in supine position, prominent lung artifact and poor               acoustic windows. Definity used as a contrast agent for                endocardial border definition and agitated saline used as a               contrast agent for intraseptal flow evaluation. Total contrast               used for this procedure was 2.0 mL via IV push.  PHYSICIAN INTERPRETATION: Left Ventricle: The left ventricular systolic function is normal, with an estimated ejection fraction of 60-65%. The patient is in atrial fibrillation which may influence the estimate of left ventricular function and transvalvular flows. There are no regional wall motion abnormalities. The left ventricular cavity size is normal. Left ventricular diastolic filling was indeterminate. There is anterobasal septal hypertrophy with a sigmoid septum and systolic anterior motion without septal-mitral contact. Left Atrium: The left atrium is moderately dilated. A bubble study using agitated saline was very technically difficult and unable to be interpreted. Right Ventricle: The right ventricle was not well visualized. Unable to determine right ventricular systolic function. Right Atrium: The right atrium is mildly dilated. Aortic Valve: The aortic valve is trileaflet. There is minimal aortic valve cusp calcification. There is no evidence of aortic valve regurgitation. The peak instantaneous gradient of the aortic valve is 6.1 mmHg. Mitral Valve: The mitral valve is mildly thickened. There is moderate mitral annular calcification. There is no evidence of mitral valve regurgitation. Tricuspid Valve: The tricuspid valve is structurally normal. There is mild to moderate tricuspid regurgitation. The Doppler estimated RVSP is mild to moderately elevated at 45.0 mmHg. Pulmonic Valve: The pulmonic valve is structurally normal. There is physiologic pulmonic valve regurgitation. Pericardium: There is no pericardial effusion noted. Aorta: The aortic root is normal. Systemic Veins: The inferior vena cava appears to be of normal size. There is IVC inspiratory collapse greater than 50%. In comparison to the previous  echocardiogram(s): There are no prior studies on this patient for comparison purposes.  CONCLUSIONS:  1. Left ventricular systolic function is normal with a 60-65% estimated ejection fraction.  2. Poorly visualized anatomical structures due to suboptimal image quality.  3. There is anterobasal septal hypertrophy with a sigmoid septum and systolic anterior motion without septal-mitral contact.  4. The left atrium is moderately dilated.  5. There is moderate mitral annular calcification.  6. Mild to moderate tricuspid regurgitation visualized.  7. Mild to moderately elevated right ventricular systolic pressure.  8. The patient is in atrial fibrillation which may influence the estimate of left ventricular function and transvalvular flows. QUANTITATIVE DATA SUMMARY: 2D MEASUREMENTS:                    Normal Ranges: Ao Root d: 3.40 cm (2.0-3.7cm) LA VOLUME:                              Normal Ranges: LA Vol A4C:        77.4 ml   (22+/-6mL/m2) LA Vol Index A4C:  43.6ml/m2 LA Area A4C:       24.7 cm2 LA Major Axis A4C: 6.7 cm AORTIC VALVE:                         Normal Ranges: AoV Vmax:      1.23 m/s (<=1.7m/s) AoV Peak P.1 mmHg (<20mmHg) LVOT Max Villa:  0.97 m/s (<=1.1m/s) LVOT VTI:      18.60 cm LVOT Diameter: 2.00 cm  (1.8-2.4cm) AoV Area,Vmax: 2.49 cm2 (2.5-4.5cm2) TRICUSPID VALVE/RVSP:                             Normal Ranges: Peak TR Velocity: 3.24 m/s RV Syst Pressure: 45.0 mmHg (< 30mmHg) IVC Diam:         1.80 cm PULMONIC VALVE:                      Normal Ranges: PV Max Villa: 0.8 m/s  (0.6-0.9m/s) PV Max P.9 mmHg  [id number] Adrien Lassiter MD Electronically signed on 6/10/2024 at 10:45:15 AM  ** Final **        Results from last 7 days   Lab Units 24  0010   HEMOGLOBIN A1C % 8.2*     BNP   Date/Time Value Ref Range Status   2024 12:10  (H) 0 - 99 pg/mL Final       Test Results Pending At Discharge  Pending Labs       No current pending labs.            Hospital Course  79 y.o. RH female w/  PMHx of CAD, Afib (on Warfarin, subtherapeutic INR), VVIR pacemaker (placed on 8/25/2023, DM2 (diagnosed 8/2023 with A1c 7.6), C2-C6 cervical disc disease (significant b/l foraminal canal stenosis), [place] [facility] [place] syndrome, LKW 19:00 pm, s/p TNK @ 20:17 s/p thrombectomy 22:30 6/8. Ad[facility] to [facility] for post-MT care. rCTH performed, negative for SAH. Downgraded to LING 06/10/24 with neuro checks every 2 hours. TTE 6/10 shows Afib, normal LV function, and no evidence of LV thrombus. Limited improvement in physical exam and NIHSS.  We discussed patient's current medical status, reviewed imaging, and went over the patient's prognosis.  After significant discussion, the family relayed the patient's previously expressed wishes to not continue with life support measures. Therefore, patient's status changed to Comfort Care.  Patient was discharged to hospice.    Pertinent Physical Exam At Time of Discharge  Physical Exam  Neurological Exam  GENERAL APPEARANCE:  No distress, Alert, able to maintain gaze  No resp distress or excessive secretions.   Abdomen nd nt  Outpatient Follow-Up  No future appointments.    Kandace Chaparro MD

## 2024-06-15 NOTE — DISCHARGE SUMMARY
Discharge Diagnosis  Hospice care after L. MCA stroke    Problem List  Active Problems:  There are no active Hospital Problems.      Carol Barajas is a 79 y.o. female who presented to the hospital with for inpatient hospice care after L. MCA stroke while waiting for hospice bed at [facility]. They were diagnosed with a stroke.  Etiology: Ischemic Stroke: Cardioembolism      No MRI head results found for the past 14 days  CT head wo IV contrast    Result Date: 6/10/2024  Interpreted By:  Kael Henry, STUDY: CT HEAD WO IV CONTRAST; ;  6/9/2024 8:52 pm   INDICATION: Signs/Symptoms:stability scan post thrombectomy.   COMPARISON: CT head from 06/09/2024.   ACCESSION NUMBER(S): [id number]   ORDERING CLINICIAN: JARVIS KRUGER   TECHNIQUE: Routine unenhanced CT of the head was performed.   FINDINGS: There is hypoattenuation located within the left frontal lobe consistent with evolving infarct. There is no striking hyperattenuation within this region to suggest hemorrhagic transformation. There is mild surrounding mass effect including sulcal effacement.   Hyperattenuation located within the left cerebral hemisphere on the prior CT head has significantly decreased and likely represents contrast staining. There is slight remaining hyperattenuation within the left basal ganglia which may reflect contrast staining versus small amount of blood products, attention on follow-up imaging. There is no surrounding mass effect.   Effacement of sulci within the left cerebral hemisphere has significantly decreased.   There is no midline shift. Remaining intracranial findings including findings of probable chronic small vessel ischemic changes are unchanged since prior.   There are tiny mucosal cysts located within the left maxillary sinus, otherwise the visualized paranasal sinuses and mastoid air cells are essentially clear.       1. Evolving left MCA territory infarct with pronounced hypoattenuation located within the left frontal  lobe consistent with evolving infarction. There is mild surrounding mass effect and no striking hemorrhagic transformation   2. Decrease in effacement of sulci within the left cerebral hemisphere.   3. Marked decrease in hyperattenuation throughout the left cerebral hemisphere which likely represents contrast staining. Some hyperattenuation remains within the left basal ganglia likely representing contrast staining versus blood products. No striking surrounding mass effect.   This study was interpreted at [facility].   MACRO: None   Signed by: Kael Henry 6/10/2024 8:55 AM Dictation workstation:   [id number]    CT head wo IV contrast    Result Date: 6/9/2024  Interpreted By:  Kael Henry, STUDY: CT HEAD WO IV CONTRAST; ;  6/9/2024 6:47 am   INDICATION: Signs/Symptoms:Post thrombectomy.   COMPARISON: CT head from 06/08/2024.   ACCESSION NUMBER(S): [id number]   ORDERING CLINICIAN: JARVIS KRUGER   TECHNIQUE: Routine unenhanced CT of the head was performed.   FINDINGS: There is new hyperattenuation in multiple regions involving the left cerebral hemisphere including within the frontal, parietal, and temporal lobes as well as the basal ganglia which may reflect contrast staining versus blood products. There is new surrounding sulcal effacement related to evolving infarct. There is no striking midline shift. Basilar cisterns and ventricles are essentially unchanged in size.   No acute transcortical infarct or hemorrhage is seen within the right cerebral hemisphere.   The calvarium is unremarkable in appearance. Small mucosal cyst within the left maxillary sinus.       Evolving left MCA territory infarct with new regions of hyperattenuation which may reflect contrast staining versus blood products. There is new surrounding mass effect including sulcal effacement. There is no midline shift.   This study was interpreted at [facility].   MACRO: None    Signed by: Kael Henry 6/9/2024 1:08 PM Dictation workstation:   [id number]   Transthoracic Echo (TTE) Complete    Result Date: 6/10/2024   [facility], [address] [place] [address]                Tel [phone] and Fax [phone] TRANSTHORACIC ECHOCARDIOGRAM REPORT  Patient Name:      MICHELLE SCHULTZ          Reading Physician:    [id number] Adrien Lassiter MD Study Date:        6/10/2024            Ordering Provider:    [id number] JARVIS KRUGER MRN/PID:           [id number]             Fellow: Accession#:        [id number]         Nurse: Date of Birth/Age: 1945 / 79 years Sonographer:          Xena Temple RDCS Gender:            F                    Additional Staff: Height:            165.10 cm            Admit Date:           6/8/2024 Weight:            70.31 kg             Admission Status:     Inpatient -                                                               Routine BSA / BMI:         1.78 m2 / 25.79      Encounter#:           [id number]                    kg/m2                                         Department Location:  [facility] Blood Pressure: 132 /88 mmHg Study Type:    TRANSTHORACIC ECHO (TTE) COMPLETE Diagnosis/ICD: Cerebral infarction due to unspecified occlusion or stenosis of                left middle cerebral artery-I63.512 Indication:    Stroke CPT Code:      Echo Complete w Full [id number] Patient History: Pertinent History: CAD, Afib, VVIR Pacemaker 8/25/2023, DM2, HTN, Syncope,                    Stroke. Study Detail: The following Echo studies were performed: 2D, M-Mode, Doppler and               color flow. Technically challenging study due to body habitus,               patient lying in supine position, prominent lung artifact and poor               acoustic windows. Definity used as a contrast agent for               endocardial border definition and agitated saline used as a                contrast agent for intraseptal flow evaluation. Total contrast               used for this procedure was 2.0 mL via IV push.  PHYSICIAN INTERPRETATION: Left Ventricle: The left ventricular systolic function is normal, with an estimated ejection fraction of 60-65%. The patient is in atrial fibrillation which may influence the estimate of left ventricular function and transvalvular flows. There are no regional wall motion abnormalities. The left ventricular cavity size is normal. Left ventricular diastolic filling was indeterminate. There is anterobasal septal hypertrophy with a sigmoid septum and systolic anterior motion without septal-mitral contact. Left Atrium: The left atrium is moderately dilated. A bubble study using agitated saline was very technically difficult and unable to be interpreted. Right Ventricle: The right ventricle was not well visualized. Unable to determine right ventricular systolic function. Right Atrium: The right atrium is mildly dilated. Aortic Valve: The aortic valve is trileaflet. There is minimal aortic valve cusp calcification. There is no evidence of aortic valve regurgitation. The peak instantaneous gradient of the aortic valve is 6.1 mmHg. Mitral Valve: The mitral valve is mildly thickened. There is moderate mitral annular calcification. There is no evidence of mitral valve regurgitation. Tricuspid Valve: The tricuspid valve is structurally normal. There is mild to moderate tricuspid regurgitation. The Doppler estimated RVSP is mild to moderately elevated at 45.0 mmHg. Pulmonic Valve: The pulmonic valve is structurally normal. There is physiologic pulmonic valve regurgitation. Pericardium: There is no pericardial effusion noted. Aorta: The aortic root is normal. Systemic Veins: The inferior vena cava appears to be of normal size. There is IVC inspiratory collapse greater than 50%. In comparison to the previous echocardiogram(s): There are no prior studies on this patient for  comparison purposes.  CONCLUSIONS:  1. Left ventricular systolic function is normal with a 60-65% estimated ejection fraction.  2. Poorly visualized anatomical structures due to suboptimal image quality.  3. There is anterobasal septal hypertrophy with a sigmoid septum and systolic anterior motion without septal-mitral contact.  4. The left atrium is moderately dilated.  5. There is moderate mitral annular calcification.  6. Mild to moderate tricuspid regurgitation visualized.  7. Mild to moderately elevated right ventricular systolic pressure.  8. The patient is in atrial fibrillation which may influence the estimate of left ventricular function and transvalvular flows. QUANTITATIVE DATA SUMMARY: 2D MEASUREMENTS:                    Normal Ranges: Ao Root d: 3.40 cm (2.0-3.7cm) LA VOLUME:                              Normal Ranges: LA Vol A4C:        77.4 ml   (22+/-6mL/m2) LA Vol Index A4C:  43.6ml/m2 LA Area A4C:       24.7 cm2 LA Major Axis A4C: 6.7 cm AORTIC VALVE:                         Normal Ranges: AoV Vmax:      1.23 m/s (<=1.7m/s) AoV Peak P.1 mmHg (<20mmHg) LVOT Max Villa:  0.97 m/s (<=1.1m/s) LVOT VTI:      18.60 cm LVOT Diameter: 2.00 cm  (1.8-2.4cm) AoV Area,Vmax: 2.49 cm2 (2.5-4.5cm2) TRICUSPID VALVE/RVSP:                             Normal Ranges: Peak TR Velocity: 3.24 m/s RV Syst Pressure: 45.0 mmHg (< 30mmHg) IVC Diam:         1.80 cm PULMONIC VALVE:                      Normal Ranges: PV Max Villa: 0.8 m/s  (0.6-0.9m/s) PV Max P.9 mmHg  [id number] Adrien Lassiter MD Electronically signed on 6/10/2024 at 10:45:15 AM  ** Final **        Results from last 7 days   Lab Units 24  0010   HEMOGLOBIN A1C % 8.2*     BNP   Date/Time Value Ref Range Status   2024 12:10  (H) 0 - 99 pg/mL Final       Test Results Pending At Discharge  Pending Labs       No current pending labs.            Hospital Course  79 y.o. RH female w/ PMHx of CAD, Afib (on Warfarin, subtherapeutic INR), VVIR pacemaker  (placed on 8/25/2023, DM2 (diagnosed 8/2023 with A1c 7.6), C2-C6 cervical disc disease (significant b/l foraminal canal stenosis), [place] [facility] syndrome, LKW 19:00 pm, s/p TNK @ 20:17 s/p thrombectomy 22:30 6/8. Ad[facility] for post-MT care. rCTH performed, negative for SAH. Downgraded to LING 06/10/24 with neuro checks every 2 hours. TTE 6/10 shows Afib, normal LV function, and no evidence of LV thrombus. Limited improvement in physical exam and NIHSS.  We discussed patient's current medical status, reviewed imaging, and went over the patient's prognosis.  After significant discussion, the family relayed the patient's previously expressed wishes to not continue with life support measures. Therefore, patient's status changed to Comfort Care.  Patient was discharged to hospice.     Pertinent Physical Exam At Time of Discharge  Physical Exam  Neurological Exam  GENERAL APPEARANCE:  No distress, Alert, able to maintain gaze  No resp distress or excessive secretions.   Abdomen nd nt      Outpatient Follow-Up  No future appointments.    Kandace Chaparro MD